# Patient Record
Sex: FEMALE | Race: ASIAN | NOT HISPANIC OR LATINO | Employment: UNEMPLOYED | URBAN - METROPOLITAN AREA
[De-identification: names, ages, dates, MRNs, and addresses within clinical notes are randomized per-mention and may not be internally consistent; named-entity substitution may affect disease eponyms.]

---

## 2020-02-12 ENCOUNTER — HOSPITAL ENCOUNTER (INPATIENT)
Facility: HOSPITAL | Age: 70
LOS: 3 days | Discharge: HOME | DRG: 342 | End: 2020-02-16
Attending: EMERGENCY MEDICINE | Admitting: FAMILY MEDICINE
Payer: COMMERCIAL

## 2020-02-12 ENCOUNTER — APPOINTMENT (EMERGENCY)
Dept: RADIOLOGY | Facility: HOSPITAL | Age: 70
DRG: 342 | End: 2020-02-12
Payer: COMMERCIAL

## 2020-02-12 DIAGNOSIS — K37 APPENDICITIS: Primary | ICD-10-CM

## 2020-02-12 DIAGNOSIS — E87.1 HYPONATREMIA: ICD-10-CM

## 2020-02-12 LAB
BASE EX.OXY STD BLDV CALC-SCNC: 76.7 % (ref 60–80)
BASE EXCESS BLDV CALC-SCNC: 1.5 MMOL/L
BASOPHILS # BLD AUTO: 0.05 THOUSANDS/ΜL (ref 0–0.1)
BASOPHILS NFR BLD AUTO: 1 % (ref 0–1)
BETA-HYDROXYBUTYRATE: 0.1 MMOL/L
EOSINOPHIL # BLD AUTO: 0.26 THOUSAND/ΜL (ref 0–0.61)
EOSINOPHIL NFR BLD AUTO: 3 % (ref 0–6)
ERYTHROCYTE [DISTWIDTH] IN BLOOD BY AUTOMATED COUNT: 12.9 % (ref 11.6–15.1)
GLUCOSE SERPL-MCNC: 172 MG/DL (ref 65–140)
HCO3 BLDV-SCNC: 26.5 MMOL/L (ref 24–30)
HCT VFR BLD AUTO: 36.9 % (ref 34.8–46.1)
HGB BLD-MCNC: 12.6 G/DL (ref 11.5–15.4)
IMM GRANULOCYTES # BLD AUTO: 0.03 THOUSAND/UL (ref 0–0.2)
IMM GRANULOCYTES NFR BLD AUTO: 0 % (ref 0–2)
LYMPHOCYTES # BLD AUTO: 1.37 THOUSANDS/ΜL (ref 0.6–4.47)
LYMPHOCYTES NFR BLD AUTO: 15 % (ref 14–44)
MCH RBC QN AUTO: 28.5 PG (ref 26.8–34.3)
MCHC RBC AUTO-ENTMCNC: 34.1 G/DL (ref 31.4–37.4)
MCV RBC AUTO: 84 FL (ref 82–98)
MONOCYTES # BLD AUTO: 0.46 THOUSAND/ΜL (ref 0.17–1.22)
MONOCYTES NFR BLD AUTO: 5 % (ref 4–12)
NEUTROPHILS # BLD AUTO: 7.22 THOUSANDS/ΜL (ref 1.85–7.62)
NEUTS SEG NFR BLD AUTO: 76 % (ref 43–75)
NRBC BLD AUTO-RTO: 0 /100 WBCS
O2 CT BLDV-SCNC: 14.7 ML/DL
PCO2 BLDV: 43.1 MM HG (ref 42–50)
PH BLDV: 7.41 [PH] (ref 7.3–7.4)
PLATELET # BLD AUTO: 292 THOUSANDS/UL (ref 149–390)
PMV BLD AUTO: 10.8 FL (ref 8.9–12.7)
PO2 BLDV: 40.8 MM HG (ref 35–45)
RBC # BLD AUTO: 4.42 MILLION/UL (ref 3.81–5.12)
WBC # BLD AUTO: 9.39 THOUSAND/UL (ref 4.31–10.16)

## 2020-02-12 PROCEDURE — 96361 HYDRATE IV INFUSION ADD-ON: CPT

## 2020-02-12 PROCEDURE — 85730 THROMBOPLASTIN TIME PARTIAL: CPT | Performed by: EMERGENCY MEDICINE

## 2020-02-12 PROCEDURE — 84443 ASSAY THYROID STIM HORMONE: CPT | Performed by: NURSE PRACTITIONER

## 2020-02-12 PROCEDURE — 71046 X-RAY EXAM CHEST 2 VIEWS: CPT

## 2020-02-12 PROCEDURE — 36415 COLL VENOUS BLD VENIPUNCTURE: CPT | Performed by: EMERGENCY MEDICINE

## 2020-02-12 PROCEDURE — 82550 ASSAY OF CK (CPK): CPT | Performed by: EMERGENCY MEDICINE

## 2020-02-12 PROCEDURE — 99285 EMERGENCY DEPT VISIT HI MDM: CPT

## 2020-02-12 PROCEDURE — 83690 ASSAY OF LIPASE: CPT | Performed by: EMERGENCY MEDICINE

## 2020-02-12 PROCEDURE — 82805 BLOOD GASES W/O2 SATURATION: CPT | Performed by: EMERGENCY MEDICINE

## 2020-02-12 PROCEDURE — 85610 PROTHROMBIN TIME: CPT | Performed by: EMERGENCY MEDICINE

## 2020-02-12 PROCEDURE — 87631 RESP VIRUS 3-5 TARGETS: CPT | Performed by: EMERGENCY MEDICINE

## 2020-02-12 PROCEDURE — 85025 COMPLETE CBC W/AUTO DIFF WBC: CPT | Performed by: EMERGENCY MEDICINE

## 2020-02-12 PROCEDURE — 82948 REAGENT STRIP/BLOOD GLUCOSE: CPT

## 2020-02-12 PROCEDURE — 82010 KETONE BODYS QUAN: CPT | Performed by: EMERGENCY MEDICINE

## 2020-02-12 PROCEDURE — 99285 EMERGENCY DEPT VISIT HI MDM: CPT | Performed by: EMERGENCY MEDICINE

## 2020-02-12 PROCEDURE — 83880 ASSAY OF NATRIURETIC PEPTIDE: CPT | Performed by: EMERGENCY MEDICINE

## 2020-02-12 PROCEDURE — 83605 ASSAY OF LACTIC ACID: CPT | Performed by: EMERGENCY MEDICINE

## 2020-02-12 PROCEDURE — 96375 TX/PRO/DX INJ NEW DRUG ADDON: CPT

## 2020-02-12 PROCEDURE — 87040 BLOOD CULTURE FOR BACTERIA: CPT | Performed by: EMERGENCY MEDICINE

## 2020-02-12 PROCEDURE — 93005 ELECTROCARDIOGRAM TRACING: CPT

## 2020-02-12 PROCEDURE — 84550 ASSAY OF BLOOD/URIC ACID: CPT | Performed by: NURSE PRACTITIONER

## 2020-02-12 PROCEDURE — 85379 FIBRIN DEGRADATION QUANT: CPT | Performed by: EMERGENCY MEDICINE

## 2020-02-12 PROCEDURE — 84484 ASSAY OF TROPONIN QUANT: CPT | Performed by: EMERGENCY MEDICINE

## 2020-02-12 PROCEDURE — 80053 COMPREHEN METABOLIC PANEL: CPT | Performed by: EMERGENCY MEDICINE

## 2020-02-12 RX ORDER — ONDANSETRON 2 MG/ML
4 INJECTION INTRAMUSCULAR; INTRAVENOUS ONCE
Status: COMPLETED | OUTPATIENT
Start: 2020-02-12 | End: 2020-02-12

## 2020-02-12 RX ORDER — SODIUM CHLORIDE 9 MG/ML
75 INJECTION, SOLUTION INTRAVENOUS CONTINUOUS
Status: DISCONTINUED | OUTPATIENT
Start: 2020-02-12 | End: 2020-02-13

## 2020-02-12 RX ORDER — HYDROMORPHONE HCL/PF 1 MG/ML
0.5 SYRINGE (ML) INJECTION ONCE
Status: COMPLETED | OUTPATIENT
Start: 2020-02-12 | End: 2020-02-12

## 2020-02-12 RX ADMIN — SODIUM CHLORIDE 500 ML: 0.9 INJECTION, SOLUTION INTRAVENOUS at 23:35

## 2020-02-12 RX ADMIN — ONDANSETRON 4 MG: 2 INJECTION INTRAMUSCULAR; INTRAVENOUS at 23:35

## 2020-02-12 RX ADMIN — HYDROMORPHONE HYDROCHLORIDE 0.5 MG: 1 INJECTION, SOLUTION INTRAMUSCULAR; INTRAVENOUS; SUBCUTANEOUS at 23:38

## 2020-02-13 ENCOUNTER — ANESTHESIA (INPATIENT)
Dept: PERIOP | Facility: HOSPITAL | Age: 70
DRG: 342 | End: 2020-02-13
Payer: COMMERCIAL

## 2020-02-13 ENCOUNTER — ANESTHESIA EVENT (INPATIENT)
Dept: PERIOP | Facility: HOSPITAL | Age: 70
DRG: 342 | End: 2020-02-13
Payer: COMMERCIAL

## 2020-02-13 ENCOUNTER — APPOINTMENT (EMERGENCY)
Dept: CT IMAGING | Facility: HOSPITAL | Age: 70
DRG: 342 | End: 2020-02-13
Payer: COMMERCIAL

## 2020-02-13 PROBLEM — E78.5 HYPERLIPIDEMIA: Status: ACTIVE | Noted: 2020-02-13

## 2020-02-13 PROBLEM — K37 APPENDICITIS: Status: ACTIVE | Noted: 2020-02-13

## 2020-02-13 PROBLEM — I10 HYPERTENSION: Status: ACTIVE | Noted: 2020-02-13

## 2020-02-13 PROBLEM — E87.1 HYPONATREMIA: Status: ACTIVE | Noted: 2020-02-13

## 2020-02-13 PROBLEM — E11.9 DIABETES MELLITUS, TYPE 2 (HCC): Status: ACTIVE | Noted: 2020-02-13

## 2020-02-13 LAB
ALBUMIN SERPL BCP-MCNC: 4 G/DL (ref 3.5–5)
ALP SERPL-CCNC: 82 U/L (ref 46–116)
ALT SERPL W P-5'-P-CCNC: 33 U/L (ref 12–78)
ANION GAP SERPL CALCULATED.3IONS-SCNC: 11 MMOL/L (ref 4–13)
ANION GAP SERPL CALCULATED.3IONS-SCNC: 8 MMOL/L (ref 4–13)
ANION GAP SERPL CALCULATED.3IONS-SCNC: 9 MMOL/L (ref 4–13)
APTT PPP: 34 SECONDS (ref 23–37)
AST SERPL W P-5'-P-CCNC: 17 U/L (ref 5–45)
ATRIAL RATE: 63 BPM
BACTERIA UR QL AUTO: ABNORMAL /HPF
BASOPHILS # BLD AUTO: 0.04 THOUSANDS/ΜL (ref 0–0.1)
BASOPHILS NFR BLD AUTO: 1 % (ref 0–1)
BILIRUB SERPL-MCNC: 0.6 MG/DL (ref 0.2–1)
BILIRUB UR QL STRIP: NEGATIVE
BUN SERPL-MCNC: 5 MG/DL (ref 5–25)
BUN SERPL-MCNC: 5 MG/DL (ref 5–25)
BUN SERPL-MCNC: 6 MG/DL (ref 5–25)
BUN SERPL-MCNC: 7 MG/DL (ref 5–25)
BUN SERPL-MCNC: 9 MG/DL (ref 5–25)
CALCIUM SERPL-MCNC: 8.1 MG/DL (ref 8.3–10.1)
CALCIUM SERPL-MCNC: 8.5 MG/DL (ref 8.3–10.1)
CALCIUM SERPL-MCNC: 8.7 MG/DL (ref 8.3–10.1)
CALCIUM SERPL-MCNC: 8.9 MG/DL (ref 8.3–10.1)
CALCIUM SERPL-MCNC: 9.1 MG/DL (ref 8.3–10.1)
CHLORIDE SERPL-SCNC: 101 MMOL/L (ref 100–108)
CHLORIDE SERPL-SCNC: 89 MMOL/L (ref 100–108)
CHLORIDE SERPL-SCNC: 96 MMOL/L (ref 100–108)
CHLORIDE SERPL-SCNC: 98 MMOL/L (ref 100–108)
CHLORIDE SERPL-SCNC: 98 MMOL/L (ref 100–108)
CK SERPL-CCNC: 92 U/L (ref 26–192)
CLARITY UR: CLEAR
CO2 SERPL-SCNC: 22 MMOL/L (ref 21–32)
CO2 SERPL-SCNC: 24 MMOL/L (ref 21–32)
CO2 SERPL-SCNC: 26 MMOL/L (ref 21–32)
CO2 SERPL-SCNC: 27 MMOL/L (ref 21–32)
CO2 SERPL-SCNC: 28 MMOL/L (ref 21–32)
COLOR UR: ABNORMAL
CORTIS AM PEAK SERPL-MCNC: 7.3 UG/DL (ref 4.2–22.4)
CREAT SERPL-MCNC: 0.51 MG/DL (ref 0.6–1.3)
CREAT SERPL-MCNC: 0.56 MG/DL (ref 0.6–1.3)
CREAT SERPL-MCNC: 0.58 MG/DL (ref 0.6–1.3)
CREAT SERPL-MCNC: 0.68 MG/DL (ref 0.6–1.3)
CREAT SERPL-MCNC: 0.89 MG/DL (ref 0.6–1.3)
D DIMER PPP FEU-MCNC: 0.54 UG/ML FEU
EOSINOPHIL # BLD AUTO: 0.14 THOUSAND/ΜL (ref 0–0.61)
EOSINOPHIL NFR BLD AUTO: 2 % (ref 0–6)
ERYTHROCYTE [DISTWIDTH] IN BLOOD BY AUTOMATED COUNT: 13 % (ref 11.6–15.1)
EST. AVERAGE GLUCOSE BLD GHB EST-MCNC: 134 MG/DL
FLUAV RNA NPH QL NAA+PROBE: NORMAL
FLUBV RNA NPH QL NAA+PROBE: NORMAL
GFR SERPL CREATININE-BSD FRML MDRD: 66 ML/MIN/1.73SQ M
GFR SERPL CREATININE-BSD FRML MDRD: 90 ML/MIN/1.73SQ M
GFR SERPL CREATININE-BSD FRML MDRD: 94 ML/MIN/1.73SQ M
GFR SERPL CREATININE-BSD FRML MDRD: 95 ML/MIN/1.73SQ M
GFR SERPL CREATININE-BSD FRML MDRD: 98 ML/MIN/1.73SQ M
GLUCOSE SERPL-MCNC: 104 MG/DL (ref 65–140)
GLUCOSE SERPL-MCNC: 110 MG/DL (ref 65–140)
GLUCOSE SERPL-MCNC: 113 MG/DL (ref 65–140)
GLUCOSE SERPL-MCNC: 124 MG/DL (ref 65–140)
GLUCOSE SERPL-MCNC: 127 MG/DL (ref 65–140)
GLUCOSE SERPL-MCNC: 134 MG/DL (ref 65–140)
GLUCOSE SERPL-MCNC: 140 MG/DL (ref 65–140)
GLUCOSE SERPL-MCNC: 147 MG/DL (ref 65–140)
GLUCOSE SERPL-MCNC: 165 MG/DL (ref 65–140)
GLUCOSE SERPL-MCNC: 222 MG/DL (ref 65–140)
GLUCOSE SERPL-MCNC: 234 MG/DL (ref 65–140)
GLUCOSE UR STRIP-MCNC: NEGATIVE MG/DL
HBA1C MFR BLD: 6.3 %
HCT VFR BLD AUTO: 33.5 % (ref 34.8–46.1)
HGB BLD-MCNC: 11.2 G/DL (ref 11.5–15.4)
HGB UR QL STRIP.AUTO: ABNORMAL
IMM GRANULOCYTES # BLD AUTO: 0.03 THOUSAND/UL (ref 0–0.2)
IMM GRANULOCYTES NFR BLD AUTO: 0 % (ref 0–2)
INR PPP: 1.08 (ref 0.84–1.19)
KETONES UR STRIP-MCNC: NEGATIVE MG/DL
LACTATE SERPL-SCNC: 0.9 MMOL/L (ref 0.5–2)
LEUKOCYTE ESTERASE UR QL STRIP: NEGATIVE
LIPASE SERPL-CCNC: 85 U/L (ref 73–393)
LYMPHOCYTES # BLD AUTO: 1.03 THOUSANDS/ΜL (ref 0.6–4.47)
LYMPHOCYTES NFR BLD AUTO: 14 % (ref 14–44)
MCH RBC QN AUTO: 28.7 PG (ref 26.8–34.3)
MCHC RBC AUTO-ENTMCNC: 33.4 G/DL (ref 31.4–37.4)
MCV RBC AUTO: 86 FL (ref 82–98)
MONOCYTES # BLD AUTO: 0.38 THOUSAND/ΜL (ref 0.17–1.22)
MONOCYTES NFR BLD AUTO: 5 % (ref 4–12)
NEUTROPHILS # BLD AUTO: 5.55 THOUSANDS/ΜL (ref 1.85–7.62)
NEUTS SEG NFR BLD AUTO: 78 % (ref 43–75)
NITRITE UR QL STRIP: NEGATIVE
NON-SQ EPI CELLS URNS QL MICRO: ABNORMAL /HPF
NRBC BLD AUTO-RTO: 0 /100 WBCS
NT-PROBNP SERPL-MCNC: 120 PG/ML
OSMOLALITY UR/SERPL-RTO: 266 MMOL/KG (ref 282–298)
OSMOLALITY UR/SERPL-RTO: 275 MMOL/KG (ref 282–298)
OSMOLALITY UR: 273 MMOL/KG
P AXIS: 63 DEGREES
PH UR STRIP.AUTO: 7.5 [PH]
PLATELET # BLD AUTO: 240 THOUSANDS/UL (ref 149–390)
PLATELET # BLD AUTO: 277 THOUSANDS/UL (ref 149–390)
PMV BLD AUTO: 10.3 FL (ref 8.9–12.7)
PMV BLD AUTO: 10.6 FL (ref 8.9–12.7)
POTASSIUM SERPL-SCNC: 3.3 MMOL/L (ref 3.5–5.3)
POTASSIUM SERPL-SCNC: 3.7 MMOL/L (ref 3.5–5.3)
POTASSIUM SERPL-SCNC: 3.8 MMOL/L (ref 3.5–5.3)
POTASSIUM SERPL-SCNC: 3.9 MMOL/L (ref 3.5–5.3)
POTASSIUM SERPL-SCNC: 3.9 MMOL/L (ref 3.5–5.3)
PR INTERVAL: 248 MS
PROT SERPL-MCNC: 7.9 G/DL (ref 6.4–8.2)
PROT UR STRIP-MCNC: NEGATIVE MG/DL
PROTHROMBIN TIME: 13.4 SECONDS (ref 11.6–14.5)
QRS AXIS: 50 DEGREES
QRSD INTERVAL: 90 MS
QT INTERVAL: 404 MS
QTC INTERVAL: 407 MS
RBC # BLD AUTO: 3.9 MILLION/UL (ref 3.81–5.12)
RBC #/AREA URNS AUTO: ABNORMAL /HPF
RSV RNA NPH QL NAA+PROBE: NORMAL
SODIUM 24H UR-SCNC: 80 MOL/L
SODIUM SERPL-SCNC: 126 MMOL/L (ref 136–145)
SODIUM SERPL-SCNC: 129 MMOL/L (ref 136–145)
SODIUM SERPL-SCNC: 131 MMOL/L (ref 136–145)
SODIUM SERPL-SCNC: 133 MMOL/L (ref 136–145)
SODIUM SERPL-SCNC: 136 MMOL/L (ref 136–145)
SP GR UR STRIP.AUTO: 1.01 (ref 1–1.03)
T WAVE AXIS: 67 DEGREES
TROPONIN I SERPL-MCNC: <0.02 NG/ML
TSH SERPL DL<=0.05 MIU/L-ACNC: 0.85 UIU/ML (ref 0.36–3.74)
URATE SERPL-MCNC: 3.6 MG/DL (ref 2–6.8)
UROBILINOGEN UR QL STRIP.AUTO: 0.2 E.U./DL
VENTRICULAR RATE: 61 BPM
WBC # BLD AUTO: 7.17 THOUSAND/UL (ref 4.31–10.16)
WBC #/AREA URNS AUTO: ABNORMAL /HPF

## 2020-02-13 PROCEDURE — 80048 BASIC METABOLIC PNL TOTAL CA: CPT | Performed by: INTERNAL MEDICINE

## 2020-02-13 PROCEDURE — 81001 URINALYSIS AUTO W/SCOPE: CPT | Performed by: EMERGENCY MEDICINE

## 2020-02-13 PROCEDURE — 71275 CT ANGIOGRAPHY CHEST: CPT

## 2020-02-13 PROCEDURE — 93010 ELECTROCARDIOGRAM REPORT: CPT | Performed by: INTERNAL MEDICINE

## 2020-02-13 PROCEDURE — 83935 ASSAY OF URINE OSMOLALITY: CPT | Performed by: INTERNAL MEDICINE

## 2020-02-13 PROCEDURE — 83930 ASSAY OF BLOOD OSMOLALITY: CPT | Performed by: INTERNAL MEDICINE

## 2020-02-13 PROCEDURE — 84300 ASSAY OF URINE SODIUM: CPT | Performed by: NURSE PRACTITIONER

## 2020-02-13 PROCEDURE — 74177 CT ABD & PELVIS W/CONTRAST: CPT

## 2020-02-13 PROCEDURE — 80048 BASIC METABOLIC PNL TOTAL CA: CPT | Performed by: SURGERY

## 2020-02-13 PROCEDURE — 96365 THER/PROPH/DIAG IV INF INIT: CPT

## 2020-02-13 PROCEDURE — 88304 TISSUE EXAM BY PATHOLOGIST: CPT | Performed by: PATHOLOGY

## 2020-02-13 PROCEDURE — 99221 1ST HOSP IP/OBS SF/LOW 40: CPT | Performed by: INTERNAL MEDICINE

## 2020-02-13 PROCEDURE — 85049 AUTOMATED PLATELET COUNT: CPT | Performed by: PHYSICIAN ASSISTANT

## 2020-02-13 PROCEDURE — 83036 HEMOGLOBIN GLYCOSYLATED A1C: CPT | Performed by: NURSE PRACTITIONER

## 2020-02-13 PROCEDURE — 85025 COMPLETE CBC W/AUTO DIFF WBC: CPT | Performed by: NURSE PRACTITIONER

## 2020-02-13 PROCEDURE — 99223 1ST HOSP IP/OBS HIGH 75: CPT | Performed by: INTERNAL MEDICINE

## 2020-02-13 PROCEDURE — 0DTJ4ZZ RESECTION OF APPENDIX, PERCUTANEOUS ENDOSCOPIC APPROACH: ICD-10-PCS | Performed by: SURGERY

## 2020-02-13 PROCEDURE — 83930 ASSAY OF BLOOD OSMOLALITY: CPT | Performed by: NURSE PRACTITIONER

## 2020-02-13 PROCEDURE — NC001 PR NO CHARGE: Performed by: PHYSICIAN ASSISTANT

## 2020-02-13 PROCEDURE — 96361 HYDRATE IV INFUSION ADD-ON: CPT

## 2020-02-13 PROCEDURE — 80048 BASIC METABOLIC PNL TOTAL CA: CPT | Performed by: NURSE PRACTITIONER

## 2020-02-13 PROCEDURE — 70450 CT HEAD/BRAIN W/O DYE: CPT

## 2020-02-13 PROCEDURE — 83935 ASSAY OF URINE OSMOLALITY: CPT | Performed by: NURSE PRACTITIONER

## 2020-02-13 PROCEDURE — 82533 TOTAL CORTISOL: CPT | Performed by: NURSE PRACTITIONER

## 2020-02-13 PROCEDURE — 82948 REAGENT STRIP/BLOOD GLUCOSE: CPT

## 2020-02-13 RX ORDER — OXYCODONE HYDROCHLORIDE 5 MG/1
2.5 TABLET ORAL EVERY 4 HOURS PRN
Status: DISCONTINUED | OUTPATIENT
Start: 2020-02-13 | End: 2020-02-16 | Stop reason: HOSPADM

## 2020-02-13 RX ORDER — LOSARTAN POTASSIUM 50 MG/1
50 TABLET ORAL DAILY
Status: DISCONTINUED | OUTPATIENT
Start: 2020-02-14 | End: 2020-02-16 | Stop reason: HOSPADM

## 2020-02-13 RX ORDER — FENTANYL CITRATE 50 UG/ML
INJECTION, SOLUTION INTRAMUSCULAR; INTRAVENOUS AS NEEDED
Status: DISCONTINUED | OUTPATIENT
Start: 2020-02-13 | End: 2020-02-13 | Stop reason: SURG

## 2020-02-13 RX ORDER — CEFAZOLIN SODIUM 2 G/50ML
2000 SOLUTION INTRAVENOUS EVERY 8 HOURS
Status: DISCONTINUED | OUTPATIENT
Start: 2020-02-13 | End: 2020-02-16

## 2020-02-13 RX ORDER — MAGNESIUM HYDROXIDE 1200 MG/15ML
LIQUID ORAL AS NEEDED
Status: DISCONTINUED | OUTPATIENT
Start: 2020-02-13 | End: 2020-02-13 | Stop reason: HOSPADM

## 2020-02-13 RX ORDER — SODIUM CHLORIDE 9 MG/ML
INJECTION, SOLUTION INTRAVENOUS CONTINUOUS PRN
Status: DISCONTINUED | OUTPATIENT
Start: 2020-02-13 | End: 2020-02-13 | Stop reason: SURG

## 2020-02-13 RX ORDER — SODIUM CHLORIDE 9 MG/ML
INJECTION, SOLUTION INTRAVENOUS AS NEEDED
Status: DISCONTINUED | OUTPATIENT
Start: 2020-02-13 | End: 2020-02-13 | Stop reason: HOSPADM

## 2020-02-13 RX ORDER — PROPOFOL 10 MG/ML
INJECTION, EMULSION INTRAVENOUS AS NEEDED
Status: DISCONTINUED | OUTPATIENT
Start: 2020-02-13 | End: 2020-02-13 | Stop reason: SURG

## 2020-02-13 RX ORDER — DEXAMETHASONE SODIUM PHOSPHATE 4 MG/ML
INJECTION, SOLUTION INTRA-ARTICULAR; INTRALESIONAL; INTRAMUSCULAR; INTRAVENOUS; SOFT TISSUE AS NEEDED
Status: DISCONTINUED | OUTPATIENT
Start: 2020-02-13 | End: 2020-02-13 | Stop reason: SURG

## 2020-02-13 RX ORDER — FENTANYL CITRATE/PF 50 MCG/ML
25 SYRINGE (ML) INJECTION
Status: DISCONTINUED | OUTPATIENT
Start: 2020-02-13 | End: 2020-02-13 | Stop reason: HOSPADM

## 2020-02-13 RX ORDER — HYDROMORPHONE HCL/PF 1 MG/ML
0.2 SYRINGE (ML) INJECTION
Status: DISCONTINUED | OUTPATIENT
Start: 2020-02-13 | End: 2020-02-13 | Stop reason: HOSPADM

## 2020-02-13 RX ORDER — METOPROLOL SUCCINATE 25 MG/1
25 TABLET, EXTENDED RELEASE ORAL DAILY
Status: DISCONTINUED | OUTPATIENT
Start: 2020-02-14 | End: 2020-02-16 | Stop reason: HOSPADM

## 2020-02-13 RX ORDER — POTASSIUM CHLORIDE 20 MEQ/1
20 TABLET, EXTENDED RELEASE ORAL ONCE
Status: COMPLETED | OUTPATIENT
Start: 2020-02-13 | End: 2020-02-13

## 2020-02-13 RX ORDER — EPHEDRINE SULFATE 50 MG/ML
INJECTION INTRAVENOUS AS NEEDED
Status: DISCONTINUED | OUTPATIENT
Start: 2020-02-13 | End: 2020-02-13 | Stop reason: SURG

## 2020-02-13 RX ORDER — ONDANSETRON 2 MG/ML
4 INJECTION INTRAMUSCULAR; INTRAVENOUS ONCE AS NEEDED
Status: DISCONTINUED | OUTPATIENT
Start: 2020-02-13 | End: 2020-02-13 | Stop reason: HOSPADM

## 2020-02-13 RX ORDER — SUCCINYLCHOLINE/SOD CL,ISO/PF 100 MG/5ML
SYRINGE (ML) INTRAVENOUS AS NEEDED
Status: DISCONTINUED | OUTPATIENT
Start: 2020-02-13 | End: 2020-02-13 | Stop reason: SURG

## 2020-02-13 RX ORDER — HEPARIN SODIUM 5000 [USP'U]/ML
5000 INJECTION, SOLUTION INTRAVENOUS; SUBCUTANEOUS EVERY 8 HOURS SCHEDULED
Status: DISCONTINUED | OUTPATIENT
Start: 2020-02-13 | End: 2020-02-16 | Stop reason: HOSPADM

## 2020-02-13 RX ORDER — GLIMEPIRIDE 1 MG/1
1 TABLET ORAL
COMMUNITY
End: 2020-05-28 | Stop reason: SDUPTHER

## 2020-02-13 RX ORDER — DEXTROSE MONOHYDRATE 50 MG/ML
50 INJECTION, SOLUTION INTRAVENOUS CONTINUOUS
Status: DISCONTINUED | OUTPATIENT
Start: 2020-02-13 | End: 2020-02-14

## 2020-02-13 RX ORDER — INDAPAMIDE 2.5 MG/1
1.25 TABLET, FILM COATED ORAL DAILY
Status: DISCONTINUED | OUTPATIENT
Start: 2020-02-13 | End: 2020-02-13

## 2020-02-13 RX ORDER — SODIUM CHLORIDE, SODIUM LACTATE, POTASSIUM CHLORIDE, CALCIUM CHLORIDE 600; 310; 30; 20 MG/100ML; MG/100ML; MG/100ML; MG/100ML
INJECTION, SOLUTION INTRAVENOUS CONTINUOUS PRN
Status: DISCONTINUED | OUTPATIENT
Start: 2020-02-13 | End: 2020-02-13 | Stop reason: SURG

## 2020-02-13 RX ORDER — LOSARTAN POTASSIUM 50 MG/1
50 TABLET ORAL DAILY
Status: DISCONTINUED | OUTPATIENT
Start: 2020-02-13 | End: 2020-02-13

## 2020-02-13 RX ORDER — TELMISARTAN 40 MG/1
40 TABLET ORAL DAILY
COMMUNITY
End: 2020-05-19 | Stop reason: SDUPTHER

## 2020-02-13 RX ORDER — GLYCOPYRROLATE 0.2 MG/ML
INJECTION INTRAMUSCULAR; INTRAVENOUS AS NEEDED
Status: DISCONTINUED | OUTPATIENT
Start: 2020-02-13 | End: 2020-02-13 | Stop reason: SURG

## 2020-02-13 RX ORDER — ROCURONIUM BROMIDE 10 MG/ML
INJECTION, SOLUTION INTRAVENOUS AS NEEDED
Status: DISCONTINUED | OUTPATIENT
Start: 2020-02-13 | End: 2020-02-13 | Stop reason: SURG

## 2020-02-13 RX ORDER — ACETAMINOPHEN 325 MG/1
650 TABLET ORAL EVERY 6 HOURS PRN
Status: DISCONTINUED | OUTPATIENT
Start: 2020-02-13 | End: 2020-02-16 | Stop reason: HOSPADM

## 2020-02-13 RX ORDER — CEFAZOLIN SODIUM 1 G/50ML
1000 SOLUTION INTRAVENOUS ONCE
Status: COMPLETED | OUTPATIENT
Start: 2020-02-13 | End: 2020-02-13

## 2020-02-13 RX ORDER — ROSUVASTATIN CALCIUM 10 MG/1
10 TABLET, COATED ORAL DAILY
COMMUNITY
End: 2020-10-13 | Stop reason: SDUPTHER

## 2020-02-13 RX ORDER — METOPROLOL SUCCINATE 25 MG/1
25 TABLET, EXTENDED RELEASE ORAL DAILY
Status: DISCONTINUED | OUTPATIENT
Start: 2020-02-13 | End: 2020-02-13

## 2020-02-13 RX ORDER — LIDOCAINE HYDROCHLORIDE 10 MG/ML
INJECTION, SOLUTION EPIDURAL; INFILTRATION; INTRACAUDAL; PERINEURAL AS NEEDED
Status: DISCONTINUED | OUTPATIENT
Start: 2020-02-13 | End: 2020-02-13 | Stop reason: SURG

## 2020-02-13 RX ORDER — NEOSTIGMINE METHYLSULFATE 1 MG/ML
INJECTION INTRAVENOUS AS NEEDED
Status: DISCONTINUED | OUTPATIENT
Start: 2020-02-13 | End: 2020-02-13 | Stop reason: SURG

## 2020-02-13 RX ORDER — ONDANSETRON 2 MG/ML
INJECTION INTRAMUSCULAR; INTRAVENOUS AS NEEDED
Status: DISCONTINUED | OUTPATIENT
Start: 2020-02-13 | End: 2020-02-13 | Stop reason: SURG

## 2020-02-13 RX ORDER — OXYCODONE HYDROCHLORIDE 5 MG/1
5 TABLET ORAL EVERY 4 HOURS PRN
Status: DISCONTINUED | OUTPATIENT
Start: 2020-02-13 | End: 2020-02-16 | Stop reason: HOSPADM

## 2020-02-13 RX ORDER — PRAVASTATIN SODIUM 80 MG/1
80 TABLET ORAL
Status: DISCONTINUED | OUTPATIENT
Start: 2020-02-13 | End: 2020-02-16 | Stop reason: HOSPADM

## 2020-02-13 RX ORDER — BUPIVACAINE HYDROCHLORIDE 2.5 MG/ML
INJECTION, SOLUTION EPIDURAL; INFILTRATION; INTRACAUDAL AS NEEDED
Status: DISCONTINUED | OUTPATIENT
Start: 2020-02-13 | End: 2020-02-13 | Stop reason: HOSPADM

## 2020-02-13 RX ORDER — PANTOPRAZOLE SODIUM 40 MG/1
40 TABLET, DELAYED RELEASE ORAL
Status: DISCONTINUED | OUTPATIENT
Start: 2020-02-13 | End: 2020-02-13

## 2020-02-13 RX ORDER — HYDROMORPHONE HCL/PF 1 MG/ML
0.2 SYRINGE (ML) INJECTION EVERY 4 HOURS PRN
Status: DISCONTINUED | OUTPATIENT
Start: 2020-02-13 | End: 2020-02-16 | Stop reason: HOSPADM

## 2020-02-13 RX ORDER — PANTOPRAZOLE SODIUM 40 MG/1
40 TABLET, DELAYED RELEASE ORAL DAILY
COMMUNITY
End: 2020-02-16 | Stop reason: HOSPADM

## 2020-02-13 RX ORDER — KETOROLAC TROMETHAMINE 30 MG/ML
INJECTION, SOLUTION INTRAMUSCULAR; INTRAVENOUS AS NEEDED
Status: DISCONTINUED | OUTPATIENT
Start: 2020-02-13 | End: 2020-02-13 | Stop reason: SURG

## 2020-02-13 RX ADMIN — CEFAZOLIN SODIUM 1000 MG: 1 SOLUTION INTRAVENOUS at 14:01

## 2020-02-13 RX ADMIN — SODIUM CHLORIDE: 0.9 INJECTION, SOLUTION INTRAVENOUS at 14:48

## 2020-02-13 RX ADMIN — CEFAZOLIN SODIUM 2000 MG: 2 SOLUTION INTRAVENOUS at 10:34

## 2020-02-13 RX ADMIN — OXYCODONE HYDROCHLORIDE 2.5 MG: 5 TABLET ORAL at 04:40

## 2020-02-13 RX ADMIN — SODIUM CHLORIDE, SODIUM LACTATE, POTASSIUM CHLORIDE, AND CALCIUM CHLORIDE: .6; .31; .03; .02 INJECTION, SOLUTION INTRAVENOUS at 13:54

## 2020-02-13 RX ADMIN — POTASSIUM CHLORIDE 20 MEQ: 1500 TABLET, EXTENDED RELEASE ORAL at 00:40

## 2020-02-13 RX ADMIN — GLYCOPYRROLATE 0.4 MG: 0.2 INJECTION, SOLUTION INTRAMUSCULAR; INTRAVENOUS at 14:54

## 2020-02-13 RX ADMIN — INDAPAMIDE 1.25 MG: 2.5 TABLET, FILM COATED ORAL at 09:07

## 2020-02-13 RX ADMIN — HEPARIN SODIUM 5000 UNITS: 5000 INJECTION INTRAVENOUS; SUBCUTANEOUS at 21:29

## 2020-02-13 RX ADMIN — HEPARIN SODIUM 5000 UNITS: 5000 INJECTION INTRAVENOUS; SUBCUTANEOUS at 16:30

## 2020-02-13 RX ADMIN — CEFAZOLIN SODIUM 2000 MG: 2 SOLUTION INTRAVENOUS at 17:37

## 2020-02-13 RX ADMIN — DEXTROSE 100 ML/HR: 5 SOLUTION INTRAVENOUS at 16:32

## 2020-02-13 RX ADMIN — PROPOFOL 150 MG: 10 INJECTION, EMULSION INTRAVENOUS at 14:05

## 2020-02-13 RX ADMIN — NEOSTIGMINE METHYLSULFATE 3 MG: 1 INJECTION INTRAVENOUS at 14:54

## 2020-02-13 RX ADMIN — CEFAZOLIN SODIUM 1000 MG: 1 SOLUTION INTRAVENOUS at 02:00

## 2020-02-13 RX ADMIN — FENTANYL CITRATE 50 MCG: 50 INJECTION INTRAMUSCULAR; INTRAVENOUS at 14:05

## 2020-02-13 RX ADMIN — IOHEXOL 100 ML: 350 INJECTION, SOLUTION INTRAVENOUS at 01:11

## 2020-02-13 RX ADMIN — POTASSIUM CHLORIDE 20 MEQ: 1500 TABLET, EXTENDED RELEASE ORAL at 10:34

## 2020-02-13 RX ADMIN — Medication 60 MG: at 14:05

## 2020-02-13 RX ADMIN — ROCURONIUM BROMIDE 15 MG: 10 SOLUTION INTRAVENOUS at 14:12

## 2020-02-13 RX ADMIN — FENTANYL CITRATE 50 MCG: 50 INJECTION INTRAMUSCULAR; INTRAVENOUS at 14:27

## 2020-02-13 RX ADMIN — PANTOPRAZOLE SODIUM 40 MG: 40 TABLET, DELAYED RELEASE ORAL at 05:04

## 2020-02-13 RX ADMIN — PRAVASTATIN SODIUM 80 MG: 80 TABLET ORAL at 16:30

## 2020-02-13 RX ADMIN — LIDOCAINE HYDROCHLORIDE 50 MG: 10 INJECTION, SOLUTION EPIDURAL; INFILTRATION; INTRACAUDAL; PERINEURAL at 14:05

## 2020-02-13 RX ADMIN — ROCURONIUM BROMIDE 10 MG: 10 SOLUTION INTRAVENOUS at 14:05

## 2020-02-13 RX ADMIN — LOSARTAN POTASSIUM 50 MG: 50 TABLET, FILM COATED ORAL at 09:08

## 2020-02-13 RX ADMIN — SODIUM CHLORIDE 125 ML/HR: 0.9 INJECTION, SOLUTION INTRAVENOUS at 03:20

## 2020-02-13 RX ADMIN — ONDANSETRON 4 MG: 2 INJECTION INTRAMUSCULAR; INTRAVENOUS at 14:11

## 2020-02-13 RX ADMIN — METRONIDAZOLE 500 MG: 500 INJECTION, SOLUTION INTRAVENOUS at 11:48

## 2020-02-13 RX ADMIN — KETOROLAC TROMETHAMINE 15 MG: 30 INJECTION, SOLUTION INTRAMUSCULAR at 14:51

## 2020-02-13 RX ADMIN — SODIUM CHLORIDE 125 ML/HR: 0.9 INJECTION, SOLUTION INTRAVENOUS at 00:31

## 2020-02-13 RX ADMIN — DEXAMETHASONE SODIUM PHOSPHATE 4 MG: 4 INJECTION, SOLUTION INTRAMUSCULAR; INTRAVENOUS at 14:11

## 2020-02-13 RX ADMIN — OXYCODONE HYDROCHLORIDE 2.5 MG: 5 TABLET ORAL at 21:33

## 2020-02-13 RX ADMIN — ACETAMINOPHEN 650 MG: 325 TABLET, FILM COATED ORAL at 17:54

## 2020-02-13 RX ADMIN — METRONIDAZOLE 500 MG: 500 INJECTION, SOLUTION INTRAVENOUS at 02:36

## 2020-02-13 RX ADMIN — METRONIDAZOLE 500 MG: 500 INJECTION, SOLUTION INTRAVENOUS at 18:47

## 2020-02-13 RX ADMIN — EPHEDRINE SULFATE 15 MG: 50 INJECTION, SOLUTION INTRAVENOUS at 14:16

## 2020-02-13 RX ADMIN — INSULIN LISPRO 1 UNITS: 100 INJECTION, SOLUTION INTRAVENOUS; SUBCUTANEOUS at 21:29

## 2020-02-13 NOTE — ASSESSMENT & PLAN NOTE
 Sodium level upon admission: 126   Secondary to likely dehydration   Treatment: IV hydration   Additional Studies:  TSH, osmolality, urine osmolality, sodium urine, uric acid and cortisol level  [facility] Neph[facility] consult   Monitor BMP q 6 hours

## 2020-02-13 NOTE — ASSESSMENT & PLAN NOTE
· Patient presented with right lower quadrant pain  · PE study with CT of the abdomen:  No evidence of pulmonary embolism  No focal lung consolidation  The appendix mildly prominent measuring 7 mm in diameter  There is a mild surrounding fat stranding  "Findings are suspicious for early appendicitis "  · Surgery consult  · Continue IV Ancef and IV Flagyl  · IV fluids  · NPO

## 2020-02-13 NOTE — CONSULTS
Consultation -General Surgery  [facility] Lnu 71 y o  female MRN: [id number]  Unit/Bed#: S -01 Encounter: [id number]          ASSESSMENT:  Problem List     Hyponatremia    Appendicitis    Hypertension    Diabetes mellitus, type 2 ([facility] )    No results found for: HGBA1C    Recent Labs     02/12/20  2331 02/13/20  0501 02/13/20  0657   POCGLU 172* 127 113       Blood Sugar Average: Last 72 hrs:  (P) 390 [id number]        Hyperlipidemia        66-year-old female PMH hypertension, hyperlipidemia, diabetes who presented to ED for hyponatremia and right-sided abdominal pain x1 day, found to have likely early appendicitis on CT scan  Plan:  1  Abd pain  - likely secondary to appendicitis  - keep npo for now  - will discuss with attending  - pain is improving  - no leukocytosis, afebrile  VSS  - IVF  - IV abx    2  Hyponatremia  - managed per primary team  - Improved from 126 to 129 today    Rest of care per primary medicine team      Reason for Consult / Principal Problem:    HPI: Nalini Henry is a 71y o  year old female who presents to ED for hyponatremia and abd pain x1 day  Pt originally went to an urgent care which showed a sodium of 125 and was then sent the ED  CT scan in ED showed concern for early appendicitis  Patient states she had 1 day of right lower quadrant abdominal pain that moved up to right flank with nausea, no vomiting  Admits to decreased appetite, chills  States abdominal pain is worsened with deep breath  Denies fevers, chills, chest pain, shortness of breath, changes to bowel or bladder  Last BM was yesterday morning and was normal   Per patient, abdominal pain is improving  Only surgical history is had hysterectomy times 20 years ago  Denies taking any anticoagulation  Per son-in-law, patient takes aspirin but patient denies this    Son-in-law states he does not think patient will elect to do surgery here and instead go to [facility] for surgery due to more people to take care of her in [place]  However while using blue phone, patient states she is amenable for surgery here in the hospital       [facility] phone personnel pin #: [phone]  Pt speaks Victorino  Review of Systems   Constitutional: Positive for activity change, appetite change, chills and fatigue  Negative for diaphoresis and fever  Respiratory: Negative for shortness of breath  Gastrointestinal: Positive for abdominal pain and nausea  Negative for blood in stool, constipation, diarrhea and vomiting  Genitourinary: Negative for difficulty urinating and hematuria  All other systems reviewed and are negative  Historical Information   Past Medical History:   Diagnosis Date    Diabetes mellitus ([address] )     Hyperlipidemia     Hypertension      Past Surgical History:   Procedure Laterality Date    HYSTERECTOMY       Social History   Social History     Substance and Sexual Activity   Alcohol Use Never    Frequency: Never     Social History     Substance and Sexual Activity   Drug Use Never     Social History     Tobacco Use   Smoking Status Never Smoker   Smokeless Tobacco Never Used     History reviewed  No pertinent family history      Meds/Allergies     Medications Prior to Admission   Medication    glimepiride (AMARYL) 1 mg tablet    INDAPAMIDE PO    METOPROLOL SUCCINATE PO    NON FORMULARY    pantoprazole (PROTONIX) 40 mg tablet    rosuvastatin (CRESTOR) 10 MG tablet    telmisartan (MICARDIS) 40 mg tablet     Current Facility-Administered Medications   Medication Dose Route Frequency    acetaminophen (TYLENOL) tablet 650 mg  650 mg Oral Q6H PRN    ceFAZolin (ANCEF) IVPB (premix) 2,000 mg  2,000 mg Intravenous Q8H    HYDROmorphone (DILAUDID) injection 0 2 mg  0 2 mg Intravenous Q4H PRN    indapamide (LOZOL) tablet 1 25 mg  1 25 mg Oral Daily    influenza vaccine, high-dose (FLUZONE HIGH-DOSE) IM injection NUBIA 0 5 mL  0 5 mL Intramuscular Once    insulin lispro (HumaLOG) 100 units/mL subcutaneous injection 1-5 Units  1-5 Units Subcutaneous Q6H [facility]    losartan (COZAAR) tablet 50 mg  50 mg Oral Daily    metoprolol succinate (TOPROL-XL) 24 hr tablet 25 mg  25 mg Oral Daily    metroNIDAZOLE (FLAGYL) IVPB (premix) 500 mg  500 mg Intravenous Q8H    oxyCODONE (ROXICODONE) IR tablet 2 5 mg  2 5 mg Oral Q4H PRN    oxyCODONE (ROXICODONE) IR tablet 5 mg  5 mg Oral Q4H PRN    pantoprazole (PROTONIX) EC tablet 40 mg  40 mg Oral Early Morning    pravastatin (PRAVACHOL) tablet 80 mg  80 mg Oral Daily With Dinner    sodium chloride 0 9 % infusion  100 mL/hr Intravenous Continuous       No Known Allergies    Objective     Blood pressure 110/60, pulse 60, temperature 98 6 °F (37 °C), temperature source Oral, resp  rate 18, height 5' (1 524 m), weight 62 2 kg (137 lb 2 oz), SpO2 97 %  Intake/Output Summary (Last 24 hours) at 2/13/2020 0838  Last data filed at 2/13/2020 0538  Gross per 24 hour   Intake 550 ml   Output 900 ml   Net -350 ml       PHYSICAL EXAM  Physical Exam   Constitutional: She is oriented to person, place, and time  She appears well-developed and well-nourished  No distress  Cardiovascular: Normal rate, regular rhythm and normal heart sounds  Exam reveals no gallop and no friction rub  No murmur heard  Abdominal: Soft  Bowel sounds are normal  She exhibits no distension and no mass  There is tenderness (in RLQ > RUQ)  There is tenderness at McBurney's point  There is no rebound, no guarding and no CVA tenderness  Neurological: She is alert and oriented to person, place, and time  Skin: Skin is warm and dry  She is not diaphoretic  Psychiatric: She has a normal mood and affect  Her behavior is normal    Nursing note and vitals reviewed  Ext: warm, no obvious deformity or edema      Lab Results:   I have personally reviewed pertinent lab results    , CBC:   Lab Results   Component Value Date    WBC 7 17 02/13/2020    HGB 11 2 (L) 02/13/2020    HCT 33 5 (L) 02/13/2020    MCV 86 02/13/2020     02/13/2020    MCH 28 7 02/13/2020    MCHC 33 4 02/13/2020    RDW 13 0 02/13/2020    MPV 10 3 02/13/2020    NRBC 0 02/13/2020   , CMP:   Lab Results   Component Value Date    SODIUM 129 (L) 02/13/2020    K 3 7 02/13/2020    CL 96 (L) 02/13/2020    CO2 24 02/13/2020    BUN 6 02/13/2020    CREATININE 0 51 (L) 02/13/2020    CALCIUM 8 1 (L) 02/13/2020    AST 17 02/12/2020    ALT 33 02/12/2020    ALKPHOS 82 02/12/2020    EGFR 98 02/13/2020   , Coagulation:   Lab Results   Component Value Date    INR 1 08 02/12/2020   , Urinalysis:   Lab Results   Component Value Date    COLORU Light Yellow 02/13/2020    CLARITYU Clear 02/13/2020    SPECGRAV 1 010 02/13/2020    PHUR 7 5 02/13/2020    LEUKOCYTESUR Negative 02/13/2020    NITRITE Negative 02/13/2020    GLUCOSEU Negative 02/13/2020    KETONESU Negative 02/13/2020    BILIRUBINUR Negative 02/13/2020    BLOODU Trace-Intact (A) 02/13/2020   , Amylase: No results found for: AMYLASE, Lipase:   Lab Results   Component Value Date    LIPASE 85 02/12/2020     Imaging: I have personally reviewed pertinent reports  CT chest/abd/pelvis 2/13:   1  No evidence of pulmonary embolism  No focal lung consolidation  2   The appendix is mildly prominent measuring 7 mm in diameter  There is mild surrounding fat stranding  Findings are suspicious for early appendicitis  Counseling / Coordination of Care  Total time spent today  30 minutes  Greater than 50% of total time was spent with the patient and / or family counseling and / or coordination of care           Mary Alice Lucia PA-C  2/13/2020 8:38 AM

## 2020-02-13 NOTE — H&P
[facility]    H&P- Samarvati Lnu 1950, 71 y o  female MRN: [id number]    Unit/Bed#: S -[place] Encounter: [id number]    Primary Care Provider: No primary care provider on file  Date and time admitted to hospital: 2/12/2020 10:54 PM        Hyponatremia  Assessment & Plan   Sodium level upon admission: 126   Secondary to likely dehydration   Treatment: IV hydration   Additional Studies:  TSH, osmolality, urine osmolality, sodium urine, uric acid and cortisol level  [facility]   Monitor BMP q 6 hours  Appendicitis  Assessment & Plan  · Patient presented with right lower quadrant pain  · PE study with CT of the abdomen:  No evidence of pulmonary embolism  No focal lung consolidation  The appendix mildly prominent measuring 7 mm in diameter  There is a mild surrounding fat stranding  "Findings are suspicious for early appendicitis "  · Surgery consult  · Continue IV Ancef and IV Flagyl  · IV fluids  · NPO  Hypertension  Assessment & Plan  · Continue home medications  Diabetes mellitus, type 2 [facility])  Assessment & Plan  No results found for: HGBA1C    Recent Labs     02/12/20  2331 02/13/20  0501   POCGLU 172* 127       Blood Sugar Average: Last 72 hrs:  (P) 149 5   · Home regimen of Amaryl  · Sliding scale insulin and q 6 hour Accu-Cheks  Hyperlipidemia  Assessment & Plan  · Continue statin  VTE Prophylaxis: Pharmacologic VTE Prophylaxis contraindicated due to possible surgical intervention  / sequential compression device   Code Status: Full  POLST: POLST form is not discussed and not completed at this time  Discussion with family: Son-in-law at bedside  Anticipated Length of Stay:  Patient will be admitted on an Inpatient basis with an anticipated length of stay of  > 2 midnights  Justification for Hospital Stay: Surgery consult, Hyponatremia    Total Time for Visit, including Counseling / Coordination of Care: 1 hour    Greater than 50% of this total time spent on direct patient counseling and coordination of care  Chief Complaint:   Abdominal pain, loss of appetite, and nausea  History of Present Illness:    Maira Yu is a 71 y o  female with a past medical history of HTN, HLD and DM2 who presents with right-sided abdominal pain, loss of appetite nausea  Patient's history of present illness is limited due to the fact that the patient only speaks Victorino only  Patient has been visiting from [place] for the past 3 months  Patient's son in law was at the bedside providing history  The patient initially presented to urgent care yesterday with complaints of worsening right-sided abdominal pain and nausea  When at the urgent care, patient's sodium level was found to be 125; therefore, she was refered to the ER for further evaluation  Her son-in-law reports that she was reluctant to come to the hospital   However with convincing she agreed to being evaluated in ER  Upon evaluation in the ER, patient's sodium was 126 on admission  CT of the abdomen indicated an early appendicitis  ER provider discussed case with surgery on-call  Patient was admitted for serial lab monitoring due to hyponatremia and surgery consult evaluation for appendicitis  Son-in-law is concerned that patient will not be agreeable if a surgical intervention is necessary  He also discussed that family in [place] will need to provide consent as well due to cultural beliefs  Patient reports loss of appetite, nausea, right-sided abdominal pain  She denies chest pain, diarrhea, vomiting, shortness of breath or cough  Review of Systems:    Review of Systems   Constitutional: Positive for appetite change  Negative for chills and fever  Respiratory: Negative for shortness of breath  Cardiovascular: Negative for chest pain  Gastrointestinal: Positive for abdominal pain and nausea  Negative for diarrhea and vomiting  Musculoskeletal: Negative for arthralgias and back pain  All other systems reviewed and are negative  Past Medical and Surgical History:     Past Medical History:   Diagnosis Date    Diabetes mellitus ([facility] )     Hyperlipidemia     Hypertension        Past Surgical History:   Procedure Laterality Date    HYSTERECTOMY         Meds/Allergies:    Prior to Admission medications    Medication Sig Start Date End Date Taking? Authorizing Provider   glimepiride (AMARYL) 1 mg tablet Take 1 mg by mouth daily with breakfast   Yes Historical Provider, MD   INDAPAMIDE PO Take 1 5 mg by mouth daily after breakfast   Yes Historical Provider, MD   METOPROLOL SUCCINATE PO Take 23 75 mg by mouth daily Combination drug: Telmisartan 40 mg and Metoprolol Succinate 23 75 mg po daily   Yes Historical Provider, MD   NON FORMULARY Take 1 tablet by mouth daily Moreplex CD 1 tablet after breakfast   Yes Historical Provider, MD   pantoprazole (PROTONIX) 40 mg tablet Take 40 mg by mouth daily Combination drug:  Domperidone 30mg (SR) and Pantoprazole 40 mg po daily   Yes Historical Provider, MD   rosuvastatin (CRESTOR) 10 MG tablet Take 10 mg by mouth daily Combination drug: Rosuvastatin 10mg po and Aspirin 75 mg po QHS   Yes Historical Provider, MD   telmisartan (MICARDIS) 40 mg tablet Take 40 mg by mouth daily   Yes Historical Provider, MD     I have reviewed home medications with patient family member  Allergies: No Known Allergies    Social History:     Marital Status: /Civil Union   Occupation: Unknown  Patient Pre-hospital Living Situation: Visiting family in private residence  Patient Pre-hospital Level of Mobility: Independent  Patient Pre-hospital Diet Restrictions: None    Substance Use History:   Social History     Substance and Sexual Activity   Alcohol Use Never    Frequency: Never     Social History     Tobacco Use   Smoking Status Never Smoker   Smokeless Tobacco Never Used     Social History     Substance and Sexual Activity   Drug Use Never       Family History:    non-contributory    Physical Exam:     Vitals:   Blood Pressure: 122/60 (02/13/20 0351)  Pulse: 62 (02/13/20 0351)  Temperature: 97 7 °F (36 5 °C) (02/13/20 0351)  Temp Source: Oral (02/13/20 0351)  Respirations: 18 (02/13/20 0351)  Height: 5' (152 4 cm) (02/13/20 0351)  Weight - Scale: 62 2 kg (137 lb 2 oz) (02/13/20 0351)  SpO2: 97 % (02/13/20 0351)    Physical Exam   Constitutional: She is oriented to person, place, and time  She appears well-developed and well-nourished  HENT:   Head: Normocephalic and atraumatic  Eyes: Conjunctivae are normal    Neck: Neck supple  Cardiovascular: Normal rate, regular rhythm, normal heart sounds and intact distal pulses  Exam reveals no gallop and no friction rub  No murmur heard  Pulmonary/Chest: Effort normal and breath sounds normal  No respiratory distress  She has no wheezes  She has no rales  She exhibits no tenderness  Abdominal: Soft  Bowel sounds are normal  She exhibits no distension  There is tenderness in the right upper quadrant and right lower quadrant  Musculoskeletal: Normal range of motion  Generalized weakness  Neurological: She is alert and oriented to person, place, and time  Skin: Skin is warm and intact  Capillary refill takes 2 to 3 seconds  No rash noted  Nursing note and vitals reviewed  Additional Data:     Lab Results: I have personally reviewed pertinent reports        Results from last 7 days   Lab Units 02/13/20 0441   WBC Thousand/uL 7 17   HEMOGLOBIN g/dL 11 2*   HEMATOCRIT % 33 5*   PLATELETS Thousands/uL 240   NEUTROS PCT % 78*   LYMPHS PCT % 14   MONOS PCT % 5   EOS PCT % 2     Results from last 7 days   Lab Units 02/13/20  0441 02/12/20  2300   SODIUM mmol/L 129* 126*   POTASSIUM mmol/L 3 7 3 3*   CHLORIDE mmol/L 96* 89*   CO2 mmol/L 24 28   BUN mg/dL 6 9   CREATININE mg/dL 0 51* 0 58*   ANION GAP mmol/L 9 9   CALCIUM mg/dL 8 1* 9 1   ALBUMIN g/dL  --  4 0   TOTAL BILIRUBIN mg/dL  --  0 60   ALK PHOS U/L --  82   ALT U/L  --  33   AST U/L  --  17   GLUCOSE RANDOM mg/dL 134 165*     Results from last 7 days   Lab Units 02/12/20  2300   INR  1 08     Results from last 7 days   Lab Units 02/13/20  0501 02/12/20  2331   POC GLUCOSE mg/dl 127 172*         Results from last 7 days   Lab Units 02/12/20  2300   LACTIC ACID mmol/L 0 9       Imaging: I have personally reviewed pertinent reports  CT head without contrast   ED Interpretation by Niesha Oglesby MD (02/13 0124)   FINDINGS:      PARENCHYMA: Decreased attenuation is noted in periventricular and subcortical white matter demonstrating an appearance that is statistically most likely to represent mild microangiopathic change  No CT signs of acute infarction   No intracranial mass, mass effect or midline shift   No acute parenchymal hemorrhage  VENTRICLES AND EXTRA-AXIAL SPACES:  Normal for the patient's age  VISUALIZED ORBITS AND PARANASAL SINUSES:  Unremarkable  CALVARIUM AND EXTRACRANIAL SOFT TISSUES:  Normal    Impression:        No acute intracranial hemorrhage, midline shift, or mass effect  Workstation performed: [id number]         Final Result by Annemarie Vásquez MD (02/13 0122)      No acute intracranial hemorrhage, midline shift, or mass effect  Workstation performed: [id number]         PE Study with CT Abdomen and Pelvis with contrast   ED Interpretation by Niesha Oglesyb MD (02/13 0144)   FINDINGS:      CHEST      PULMONARY ARTERIAL TREE:  No pulmonary embolus is seen  LUNGS:  Lungs are clear   There is no tracheal or endobronchial lesion  PLEURA:  Unremarkable  HEART/AORTA:  Unremarkable for patient's age  MEDIASTINUM AND CORTES:  Unremarkable  CHEST WALL AND LOWER NECK:   Unremarkable  ABDOMEN      LIVER/BILIARY TREE:  Unremarkable  GALLBLADDER:  No calcified gallstones  No pericholecystic inflammatory change  SPLEEN:  Unremarkable        PANCREAS:  Unremarkable  ADRENAL GLANDS:  Unremarkable  KIDNEYS/URETERS:  Unremarkable  No hydronephrosis  STOMACH AND BOWEL:  Stomach is distended with fluid   No bowel obstruction  APPENDIX:  The appendix is mildly prominent measuring 7 mm in diameter   There is mild surrounding fat stranding (series 605, image 65)  ABDOMINOPELVIC CAVITY:  No ascites or free intraperitoneal air   Periportal lymph nodes measure up to 1 cm in short axis  VESSELS:  Unremarkable for patient's age  PELVIS      REPRODUCTIVE ORGANS:  Patient is status post hysterectomy  URINARY BLADDER:  Unremarkable  ABDOMINAL WALL/INGUINAL REGIONS:  Unremarkable  OSSEOUS STRUCTURES:  No acute fracture or destructive osseous lesion  Impression:        1   No evidence of pulmonary embolism   No focal lung consolidation  2   The appendix is mildly prominent measuring 7 mm in diameter   There is mild surrounding fat stranding   Findings are suspicious for early appendicitis        I personally discussed this study with [address] [place] [address] on 2/13/2020 at 1:46 AM          Workstation performed: [id number]         Final Result by [place] MD (02/13 3366)      1  No evidence of pulmonary embolism  No focal lung consolidation  2   The appendix is mildly prominent measuring 7 mm in diameter  There is mild surrounding fat stranding  Findings are suspicious for early appendicitis  I personally discussed this study with [address] [place] [address] on 2/13/2020 at 1:46 AM          Workstation performed: [id number]         XR chest 2 views   ED Interpretation by Majo Tang MD (02/13 9845)   Increased bilateral perihilar markings read by me  EKG, Pathology, and Other Studies Reviewed on Admission:   · EKG: Sinus rhythm, AV block, heart rate 61  Allscripts / Epic Records Reviewed: Yes     ** Please Note: This note has been constructed using a voice recognition system   **

## 2020-02-13 NOTE — OCCUPATIONAL THERAPY NOTE
Occupational Therapy Cancellation Note        Patient Name: Lori Pisano Date: 2/13/2020      OT orders received and chart review completed  Marcy lopez/ Julio César Campbell and RN  Plan for OR for appendectomy  Will continue to follow as appropriate and as schedule allows post operatively      Edwyna Gens, OTR/L

## 2020-02-13 NOTE — ED NOTES
Pt laying on stretcher with HOB elevated  Son-in-law at bedside  Pt now c/o lightheadedness  HR noted to drop to 48-58, Sinus [facility] with arrhythmias noted  Dr Mckenzie Dunham updated/at bedside  No further orders received       Efe Hernandez RN  02/13/20 8504

## 2020-02-13 NOTE — CONSULTS
Consultation - Nephrology   [facility] 71 [facility] MRN: [id number]  Unit/Bed#: S -01 Encounter: [id number]    Consults    History of Present Illness   Physician Requesting Consult: Gardenia Sever, MD  Reason for Consult / Principal Problem:  Hyponatremia  Hx and PE limited by:  Darin but her son translated completely by the bedside    HPI: Jasvir Bocanegra is a 71y o  year old female with history of hypertension/diabetes mellitus type 2/dyslipidemia who presents with a 1 day history of right upper quadrant and lower quadrant abdominal pain  Some nausea but no vomiting  No diarrhea  Chills but no fevers  No shortness of breath or chest pain  No leg swelling  No active urinary symptoms including dysuria hematuria voiding symptoms  We are asked to see the patient because of hyponatremia:  There is no history of hyponatremia according to the son but the patient [place] been in this country [place] approximately 1 [place] half [place]  Only recorded sodiums are 126 at 2300 on 02/12  And 129 from 0441 hours  The patient denies any excessive fluid intake  Only decrease in oral intake with last 24 hours  No NSAID use  She does take and Depo might for hypertension and also Protonix for GI symptoms        History obtained from the patient and the chart which I completely reviewed        General:  Overall feeling well until recent events no fevers or chills  Cardiovascular:  No chest pain shortness of breath or leg swelling  Respiratory:  Minimal nonproductive cough over the last couple months  Gastrointestinal:  See HPI  Genitourinary:  See HPI  Neuro:  Slight headache no other neurologic symptoms  All other systems were reviewed and are negative    Historical Information   Past Medical History:   Diagnosis Date    Diabetes mellitus ([address] )     Hyperlipidemia     Hypertension      Past Surgical History:   Procedure Laterality Date    HYSTERECTOMY       Social History   Social History     Substance and Sexual Activity   Alcohol Use Never    Frequency: Never     Social History     Substance and Sexual Activity   Drug Use Never     Social History     Tobacco Use   Smoking Status Never Smoker   Smokeless Tobacco Never Used     History reviewed  No pertinent family history      Meds/Allergies   all current active meds have been reviewed, current meds:   Current Facility-Administered Medications   Medication Dose Route Frequency    acetaminophen (TYLENOL) tablet 650 mg  650 mg Oral Q6H PRN    ceFAZolin (ANCEF) IVPB (premix) 2,000 mg  2,000 mg Intravenous Q8H    HYDROmorphone (DILAUDID) injection 0 2 mg  0 2 mg Intravenous Q4H PRN    indapamide (LOZOL) tablet 1 25 mg  1 25 mg Oral Daily    influenza vaccine, high-dose (FLUZONE HIGH-DOSE) IM injection NUBIA 0 5 mL  0 5 mL Intramuscular Once    insulin lispro (HumaLOG) 100 units/mL subcutaneous injection 1-5 Units  1-5 Units Subcutaneous Q6H [address]    losartan (COZAAR) tablet 50 mg  50 mg Oral Daily    metoprolol succinate (TOPROL-XL) 24 hr tablet 25 mg  25 mg Oral Daily    metroNIDAZOLE (FLAGYL) IVPB (premix) 500 mg  500 mg Intravenous Q8H    oxyCODONE (ROXICODONE) IR tablet 2 5 mg  2 5 mg Oral Q4H PRN    oxyCODONE (ROXICODONE) IR tablet 5 mg  5 mg Oral Q4H PRN    pantoprazole (PROTONIX) EC tablet 40 mg  40 mg Oral Early Morning    pravastatin (PRAVACHOL) tablet 80 mg  80 mg Oral Daily With Dinner    sodium chloride 0 9 % infusion  100 mL/hr Intravenous Continuous    and PTA meds:    Medications Prior to Admission   Medication    glimepiride (AMARYL) 1 mg tablet    INDAPAMIDE PO    METOPROLOL SUCCINATE PO    NON FORMULARY    pantoprazole (PROTONIX) 40 mg tablet    rosuvastatin (CRESTOR) 10 MG tablet    telmisartan (MICARDIS) 40 mg tablet       No Known Allergies    Objective     Intake/Output Summary (Last 24 hours) at 2/13/2020 0920  Last data filed at 2/13/2020 0538  Gross per 24 hour   Intake 550 ml   Output 900 ml   Net -350 ml     Patient Vitals for the past 24 hrs:   BP Temp Temp src Pulse Resp SpO2 Height Weight   02/13/20 0700 110/60 98 6 °F (37 °C) Oral 60 18 97 %     02/13/20 0351 122/60 97 7 °F (36 5 °C) Oral 62 18 97 % 5' (1 524 m) 62 2 kg (137 lb 2 oz)   02/13/20 0234 144/67   64 16 98 %     02/13/20 0153 140/67 98 7 °F (37 1 °C) Oral 70 16 99 %     02/13/20 0045 129/66   58 16 99 %     02/13/20 0030 129/66   58 17 98 %     02/13/20 0000        62 5 kg (137 lb 12 6 oz)   02/12/20 2340 166/67   62 16 98 %     02/12/20 2241 141/65 98 9 °F (37 2 °C) Oral 66 18 99 %         Invasive Devices:      Vitals:    02/13/20 0700   BP: 110/60   Pulse: 60   Resp: 18   Temp: 98 6 °F (37 °C)   SpO2: 97%     General:  Well-developed well-nourished no acute distress  Skin:  No acute rash  Eyes:  No scleral icterus noninjected  ENT:  Normocephalic/atraumatic, mucous membranes moist  Neck:  Supple, no jugular venous distention, no carotid bruits, 1+ carotid upstroke, no thyromegaly, midline trachea  Back:  No CVA tenderness  Chest:  Clear to auscultation percussion, good respiratory effort, no use of accessory respiratory muscles  CVS:  Regular rate and rhythm without a murmur rub or gallops appreciable  Abdomen:  Normal bowel sounds, soft very minimal tenderness to deep palpation the right upper quadrant, perhaps slight tenderness the right lower quadrant no rebound tenderness no overt masses or hepatosplenomegaly or bruits appreciable  Extremities:  No clubbing, no cyanosis, no edema; 1+ dorsalis pedis pulses and no femoral bruits; no arthritic changes  Neuro:  No gross focality  Psych:  Alert and oriented and appropriate    Current Weight: Weight - Scale: 62 2 kg (137 lb 2 oz)  First Weight: Weight - Scale: 62 5 kg (137 lb 12 6 oz)    Lab Results:  I have personally reviewed pertinent labs    CBC:   Lab Results   Component Value Date    WBC 7 17 02/13/2020    RBC 3 90 02/13/2020     CMP:   Lab Results   Component Value Date    CL 96 (L) 02/13/2020    CO2 24 02/13/2020    BUN 6 02/13/2020    CREATININE 0 51 (L) 02/13/2020    CALCIUM 8 1 (L) 02/13/2020    AST 17 02/12/2020    ALT 33 02/12/2020    ALKPHOS 82 02/12/2020    EGFR 98 02/13/2020     Phosphorus: No results found for: PHOS  Magnesium: No results found for: MG  Urinalysis:   Lab Results   Component Value Date    COLORU Light Yellow 02/13/2020    CLARITYU Clear 02/13/2020    SPECGRAV 1 010 02/13/2020    PHUR 7 5 02/13/2020    LEUKOCYTESUR Negative 02/13/2020    NITRITE Negative 02/13/2020    GLUCOSEU Negative 02/13/2020    KETONESU Negative 02/13/2020    BILIRUBINUR Negative 02/13/2020    BLOODU Trace-Intact (A) 02/13/2020     BMP:   Lab Results   Component Value Date    SODIUM 129 (L) 02/13/2020    CO2 24 02/13/2020    BUN 6 02/13/2020    CREATININE 0 51 (L) 02/13/2020    CALCIUM 8 1 (L) 02/13/2020     Radiology review:  · CT the chest abdomen pelvis and had please see below in the assessment and plan          Assessment/Plan   1  Hyponatremia:  · Differential diagnosis would include use of indepamide(a  thiazide like diuretic), prerenal/pain leading to increase in ADH release/rule out hypothyroidism and adrenal insufficiency  Certainly other causes of SIADH or possible including Protonix  Workup:  · Urine sodium:  80 although this was on a diuretic  · Urine osmolality:  Pending  · Serum osmolality:  Pending  · Uric acid:  3 6 borderline low possibly compatible with SIADH  · TSH:  Normal at 0 846  · A m  Cortisol: To be obtained  · CT of the head:  No acute abnormality  · CT of the chest abdomen pelvis:  Possible appendicitis otherwise no other abnormality  Treatment:  · Fluid restriction 1000 mL per day  · For now isotonic saline with the presumption she may have a mild prerenal component    This is both possible related to the GI symptoms decrease intake and is well the indepamide which can make her prerenal   · AVOID INDEPAMIDE ANOTHER THIAZIDE DIURETICS PERMANENTLY  · Hold Protonix use Pepcid if needed  · Monitor sodium closely    2  Hypertension:  Low normal   Place hold parameters  Stop the indepamide  3  Hypokalemia:  Replete  In addition check magnesium level  Portions of the record may have been created with voice recognition software  Occasional wrong word or "sound a like" substitutions may have occurred due to the inherent limitations of voice recognition software  Read the chart carefully and recognize, using context, where substitutions have occurred

## 2020-02-13 NOTE — ASSESSMENT & PLAN NOTE
No results found for: HGBA1C    Recent Labs     02/12/20  2331 02/13/20  0501   POCGLU 172* 127       Blood Sugar Average: Last 72 hrs:  (P) 149 5   · Home regimen of Amaryl  · Sliding scale insulin and q 6 hour Accu-Cheks

## 2020-02-13 NOTE — ANESTHESIA POSTPROCEDURE EVALUATION
Post-Op Assessment Note    CV Status:  Stable  Pain Score: 0    Pain management: adequate     Mental Status:  Alert and awake   Hydration Status:  Euvolemic   PONV Controlled:  Controlled   Airway Patency:  Patent   Post Op Vitals Reviewed: Yes      Staff: CRNA           /67 (02/13/20 1510)    Temp (!) 97 °F (36 1 °C) (02/13/20 1510)    Pulse 69 (02/13/20 1510)   Resp 17 (02/13/20 1510)    SpO2 100 % (02/13/20 1510)

## 2020-02-13 NOTE — ANESTHESIA PREPROCEDURE EVALUATION
Review of Systems/Medical History  Patient summary reviewed    No history of anesthetic complications     Cardiovascular  EKG reviewed, Exercise tolerance (METS): >4,  Hyperlipidemia, Hypertension , No angina ,    Pulmonary  No shortness of breath, No recent URI ,        GI/Hepatic    No GERD ,   Comment: Appendicitis     Negative  ROS        Endo/Other  Diabetes type 2 ,      GYN       Hematology  Anemia ,     Musculoskeletal  Negative musculoskeletal ROS        Neurology  No seizures ,  No CVA ,    Psychology           Physical Exam    Airway    Mallampati score: II  TM Distance: >3 FB  Neck ROM: full     Dental   No notable dental hx     Cardiovascular      Pulmonary      Other Findings        Anesthesia Plan  ASA Score- 2     Anesthesia Type- general with ASA Monitors  Additional Monitors:   Airway Plan: ETT  Comment: Consent obtained with the help of pt's son-in-law  Pt and family understand the risks of GETA including nausea, sore throat, injury to lips/teeth as well as rare surgical and anesthetic complications        Plan Factors-    Induction- intravenous  Postoperative Plan-     Informed Consent- Anesthetic plan and risks discussed with patient (son in law)  I personally reviewed this patient with the CRNA  Discussed and agreed on the Anesthesia Plan with the CRNA  Jimmy Mckeon

## 2020-02-13 NOTE — OP NOTE
OPERATIVE REPORT  PATIENT NAME: Nidia Thornton    :  1950  MRN: [id number]  Pt Location: AN OR ROOM 02    SURGERY DATE: 2020    Surgeon(s) and Role:     * Luis Angel Alvarenga MD - Primary     * Chaka Alonso PA-C - Assisting as no qualified surgical resident available  Her assistance was needed for exposure and retraction  Preop Diagnosis:  Appendicitis [K37]    Post-Op Diagnosis Codes:     * Appendicitis [K37]   retrocecal    Procedure(s) (LRB):  APPENDECTOMY LAPAROSCOPIC (N/A)    Specimen(s):  ID Type Source Tests Collected by Time Destination   1 :  Tissue Appendix TISSUE EXAM Luis Angel Alvarenga MD 2020 1445        Estimated Blood Loss:   Minimal    Drains:  NG/OG/Enteral Tube Orogastric 18 Fr Right mouth (Active)   Number of days: 0       Anesthesia Type:   General    Operative Indications:  Appendicitis [K37]      Operative Findings:  Independent, nonsmoker, ASA 2, wound class 2, BMI 27, weight 140, height 60  No history of anesthetic complications     Cardiovascular  EKG reviewed, Exercise tolerance (METS): >4,  Hyperlipidemia, Hypertension , No angina ,     Pulmonary  No shortness of breath, No recent URI ,          GI/Hepatic     No GERD ,   Comment: Appendicitis      Negative  ROS          Endo/Other  Diabetes type 2 ,        GYN         Hematology  Anemia ,       Musculoskeletal  Negative musculoskeletal ROS          Neurology  No seizures ,  No CVA ,     Psychology              Complications:   None    Procedure and Technique:  The patient was identified visually and via armband  Placed in the supine position  After general anesthesia the abdomen was prepped and draped in a sterile fashion  0 25% Marcaine with epinephrine was utilized throughout the procedure  Incision was made at the umbilicus  This carried down through skin subcutaneous tissue  Under direct vision a 12 mm trocar was then inserted  This followed by CO2 insufflation with a back pressure 15   Two additional midline 5 mm ports were then placed  Laparoscopic visualization revealed adhesions secondary to previous hysterectomy  These were tick particularly prominent in the cecal region  The retroperitoneal adhesions were then divided freeing up both the ileum ileocecal valve and cecum  It was not overtly obvious initially where the appendix was located  Appendix is father located in the retroperitoneum extending superiorly towards the gallbladder  This was pulled upwardly  The mesoappendix was divided  The base of the appendix was divided using a Endo-MARLA stapling device  The areas irrigated  Aspirated dry  Hemostasis was assured  The appendix vision was the umbilicus  Fascia was then closed with 0 Vicryl suture followed by 4 Monocryl suture and Dermabond  Patient tolerated this procedure well  Sponge instrument count correct x2     I was present for the entire procedure    Patient Disposition:  PACU     SIGNATURE: Miriam Collazo MD  DATE: February 13, 2020  TIME: 3:13 PM

## 2020-02-13 NOTE — UTILIZATION REVIEW
Initial Clinical Review    Admission: Date/Time/Statement: Admission Orders (From admission, onward)     Ordered        02/13/20 0231  Inpatient Admission  Once                   Orders Placed This Encounter   Procedures    Inpatient Admission     Standing Status:   Standing     Number of Occurrences:   1     Order Specific Question:   Admitting Physician     Answer:   Fawn Smith [[id number]]     Order Specific Question:   Level of Care     Answer:   Med Surg [[id number]]     Order Specific Question:   Estimated length of stay     Answer:   More than 2 Midnights     Order Specific Question:   Certification     Answer:   I certify that inpatient services are medically necessary for this patient for a duration of greater than two midnights  See H&P and MD Progress Notes for additional information about the patient's course of treatment  ED Arrival Information     Expected Arrival Acuity Means of Arrival Escorted By Service Admission Type    - 2/12/2020 22:34 Urgent Walk-In Family Member Hospitalist Urgent    Arrival Complaint    ABNORMAL LABS        Chief Complaint   Patient presents with    Abdominal Pain     Presents for eval of of RUQ abd pain since this AM  Seen at Urgent care and bloodwork completed  NA+ 125mg/dl  Reports loss of appetite, nausea, and intermittent diffuse abd pain, with constant sharp, stabbing pain to right   Abnormal Lab     Assessment/Plan: 72 yo Victorino speaking female in the [address] visiting for 3 months, pmhx htn, dm to ED from family's home admitted as inpatient due to hyponatremia likely from dehydration, and acute appendicitis  Presented with R abd pain, poor appetite, and nausea  She was seen at urgent care 1d pta due to worsening R abd pain and nausea  Na was 125 there, and referral made to ED  Pt did not want to go, and did not present until 2/12  On arrival to ED, na 126  Exam reveals  RUQ/RLQ abd tenderness and generalized weakness    CT showed early appendicitis  q6h chem testing, IVF, double IV antbx, analgesics in progress, renal and surgery consulted  Keeping NPO      2/13 per surgery: keeping NPO, pain is improving, IVF and IV antbx continue  Taken to OR @8472 for laparoscopic appendectomy, which is currently underway  2/13 per renal: workup for hyponatremia is in progress, await urine/serum osmo  Cortisol to be checked in am tomorrow  Differential includes med induced by thiazide diuretic, prerenal leading to increased ADH from pain; hypothyroid, or adrenal insufficiency/SIADH  Fluid restriction to 1liter daily-isotonic saline running currently due to possible prerenal cause  Could be related to GI sx and indepamide, which she was on for HTN  Need to avoid all thiazide diuretics permanently  Also hypokalemic, repleting and checking mg  ED Triage Vitals [02/12/20 2241]   Temperature Pulse Respirations Blood Pressure SpO2   98 9 °F (37 2 °C) 66 18 141/65 99 %      Temp Source Heart Rate Source Patient Position - Orthostatic VS BP Location FiO2 (%)   Oral Monitor Sitting Right arm --      Pain Score       7        Wt Readings from Last 1 Encounters:   02/13/20 62 2 kg (137 lb 2 oz)     Additional Vital Signs:   Date/Time  Temp  Pulse  Resp  BP   SpO2   02/13/20 0700  98 6 °F (37 °C)  60  18  110/60   97 %   02/13/20 0351  97 7 °F (36 5 °C)  62  18  122/60   97 %   02/13/20 0234    64  16  144/67   98 %   02/13/20 0153  98 7 °F (37 1 °C)  70  16  140/67   99 %   02/13/20 0045    58  16  129/66   99 %   02/13/20 0030    58  17  129/66   98 %   02/12/20 2340    62  16  166/67   98 %       Pertinent Labs/Diagnostic Test Results:   2/12 EKG: sinus arrhythmia, 1st degree av block, inverted t's v1/v2  2/13 CXR: nothing acute  2/13 CT head: nothing acute  2/13 CTA chest a&p: 1  No evidence of pulmonary embolism  No focal lung consolidation  2   The appendix is mildly prominent measuring 7 mm in diameter  There is mild surrounding fat stranding    Findings are suspicious for early appendicitis      Results from last 7 days   Lab Units 02/13/20  0441 02/12/20  2300   WBC Thousand/uL 7 17 9 39   HEMOGLOBIN g/dL 11 2* 12 6   HEMATOCRIT % 33 5* 36 9   PLATELETS Thousands/uL 240 292   NEUTROS ABS Thousands/µL 5 55 7 22         Results from last 7 days   Lab Units 02/13/20  1002 02/13/20  0441 02/12/20  2300   SODIUM mmol/L 133* 129* 126*   POTASSIUM mmol/L 3 9 3 7 3 3*   CHLORIDE mmol/L 98* 96* 89*   CO2 mmol/L 27 24 28   ANION GAP mmol/L 8 9 9   BUN mg/dL 5 6 9   CREATININE mg/dL 0 56* 0 51* 0 58*   EGFR ml/min/1 73sq m 95 98 94   CALCIUM mg/dL 8 7 8 1* 9 1     Results from last 7 days   Lab Units 02/12/20  2300   AST U/L 17   ALT U/L 33   ALK PHOS U/L 82   TOTAL PROTEIN g/dL 7 9   ALBUMIN g/dL 4 0   TOTAL BILIRUBIN mg/dL 0 60     Results from last 7 days   Lab Units 02/13/20  1147 02/13/20  0657 02/13/20  0501 02/12/20  2331   POC GLUCOSE mg/dl 124 113 127 172*     Results from last 7 days   Lab Units 02/13/20  1002 02/13/20  0441 02/12/20  2300   GLUCOSE RANDOM mg/dL 110 134 165*     Results from last 7 days   Lab Units 02/13/20  0441   OSMOLALITY, SERUM mmol/*     Results from last 7 days   Lab Units 02/13/20  0441   HEMOGLOBIN A1C % 6 3*   EAG mg/dl 134     BETA-HYDROXYBUTYRATE   Date Value Ref Range Status   02/12/2020 0 1 <0 6 mmol/L Final      Results from last 7 days   Lab Units 02/12/20  2300   PH TI  7 406*   PCO2 TI mm Hg 43 1   PO2 TI mm Hg 40 8   HCO3 TI mmol/L 26 5   BASE EXC TI mmol/L 1 5   O2 CONTENT TI ml/dL 14 7   O2 HGB, VENOUS % 76 7     Results from last 7 days   Lab Units 02/12/20  2300   CK TOTAL U/L 92     Results from last 7 days   Lab Units 02/12/20  2300   TROPONIN I ng/mL <0 02     Results from last 7 days   Lab Units 02/12/20  2300   D-DIMER QUANTITATIVE ug/ml FEU 0 54*     Results from last 7 days   Lab Units 02/12/20  2300   PROTIME seconds 13 4   INR  1 08   PTT seconds 34     Results from last 7 days   Lab Units 02/12/20  2300   TSH 3RD GENERATON uIU/mL 0 846     Results from last 7 days   Lab Units 02/12/20  2300   LACTIC ACID mmol/L 0 9     Results from last 7 days   Lab Units 02/12/20  2300   NT-PRO BNP pg/mL 120     Results from last 7 days   Lab Units 02/12/20  2300   LIPASE u/L 85     Results from last 7 days   Lab Units 02/13/20  0201   CLARITY UA  Clear   COLOR UA  Light Yellow   SPEC GRAV UA  1 010   PH UA  7 5   GLUCOSE UA mg/dl Negative   KETONES UA mg/dl Negative   BLOOD UA  Trace-Intact*   PROTEIN UA mg/dl Negative   NITRITE UA  Negative   BILIRUBIN UA  Negative   UROBILINOGEN UA E U /dl 0 2   LEUKOCYTES UA  Negative   WBC UA /hpf None Seen   RBC UA /hpf 0-1*   BACTERIA UA /hpf None Seen   EPITHELIAL CELLS WET PREP /hpf Occasional   SODIUM UR  80     Results from last 7 days   Lab Units 02/12/20  2305   INFLUENZA A PCR  None Detected   INFLUENZA B PCR  None Detected   RSV PCR  None Detected     Results from last 7 days   Lab Units 02/12/20  2330 02/12/20  2300   BLOOD CULTURE  Received in Microbiology Lab  Culture in Progress  Received in Microbiology Lab  Culture in Progress       ED Treatment:   Medication Administration from 02/12/2020 2233 to 02/13/2020 7247       Date/Time Order Dose Route Action     02/12/2020 2335 sodium chloride 0 9 % bolus 500 mL 500 mL Intravenous New Bag     02/12/2020 2335 ondansetron (ZOFRAN) injection 4 mg 4 mg Intravenous Given     02/12/2020 2338 HYDROmorphone (DILAUDID) injection 0 5 mg 0 5 mg Intravenous Given     02/13/2020 0031 sodium chloride 0 9 % infusion 125 mL/hr Intravenous New Bag     02/13/2020 0040 potassium chloride (K-DUR,KLOR-CON) CR tablet 20 mEq 20 mEq Oral Given     02/13/2020 0200 ceFAZolin (ANCEF) IVPB (premix) 1,000 mg 1,000 mg Intravenous New Bag     02/13/2020 0236 metroNIDAZOLE (FLAGYL) IVPB (premix) 500 mg 500 mg Intravenous New Bag        Past Medical History:   Diagnosis Date    Diabetes mellitus ([facility] [address] )     Hyperlipidemia     Hypertension          Admitting Diagnosis: Hyponatremia [E87 1]  Appendicitis [K37]  Abdominal pain [R10 9]  Age/Sex: 71 y o  female  Admission Orders:  Scheduled Medications:  Medications:  cefazolin 2,000 mg Intravenous Q8H   influenza vaccine 0 5 mL Intramuscular Once   insulin lispro 1-5 Units Subcutaneous Q6H [facility]   [START ON 2/14/2020] losartan 50 mg Oral Daily   [START ON 2/14/2020] metoprolol succinate 25 mg Oral Daily   metroNIDAZOLE 500 mg Intravenous Q8H   pravastatin 80 mg Oral Daily With Dinner     Continuous IV Infusions:  sodium chloride 75 mL/hr Intravenous Continuous     PRN Meds:  acetaminophen 650 mg Oral Q6H PRN   HYDROmorphone 0 2 mg Intravenous Q4H PRN   oxyCODONE 2 5 mg Oral Q4H PRN x 1 on 2/13   oxyCODONE 5 mg Oral Q4H PRN     SCD's  NPO    IP CONSULT TO ACUTE CARE SURGERY  IP CONSULT TO NEPHROLOGY    Network Utilization Review Department  [email or web address]  ATTENTION: Please call with any questions or concerns to [phone] and carefully listen to the prompts so that you are directed to the right person  All voicemails are confidential   Reji Chen all requests for admission clinical reviews, approved or denied determinations and any other requests to dedicated fax number below belonging to the campus where the patient is receiving treatment   List of dedicated fax numbers for the Facilities:  [address] DENIALS (Administrative/Medical Necessity) [phone]   [address] (Maternity/NICU/Pediatrics) [phone]   Margaux Hunt [phone]   Larry Leigh [phone]   Marc Jackson [phone]   Vanessa Emery [phone]   [address] [place] [address] [phone]   [facility]  [phone]   [address] [facility] [address]  [address] [facility] [address] [facility] [phone]

## 2020-02-13 NOTE — ED PROVIDER NOTES
History  Chief Complaint   Patient presents with    Abdominal Pain     Presents for eval of of RUQ abd pain since this AM  Seen at Urgent care and bloodwork completed  NA+ 125mg/dl  Reports loss of appetite, nausea, and intermittent diffuse abd pain, with constant sharp, stabbing pain to right   Abnormal Lab     Patient is a 71year old female with 1 day of worsening right sided abdominal pain which is pleuritic  Felt feverish and chills  (+) nausea  No vomiting or diarrhea  Has had prior hysterectomy  Went to [facility] and Na was 125 and patient was sent to ED for further evaluation  No GI bleeding  No urinary sx  Has had intermittent R flank pain as well  No travel  No recent old records from this ED seen on computer system  [facility] website checked on this patient and no Rx found  Decreased appetite  History provided by:  Patient and relative (son in law)  Abdominal Pain   Associated symptoms: chills, cough, fever (felt feverish) and nausea    Associated symptoms: no diarrhea and no vomiting        None       Past Medical History:   Diagnosis Date    Diabetes mellitus ([facility] [address] )     Hyperlipidemia     Hypertension        Past Surgical History:   Procedure Laterality Date    HYSTERECTOMY         History reviewed  No pertinent family history  I have reviewed and agree with the history as documented  Social History     Tobacco Use    Smoking status: Never Smoker    Smokeless tobacco: Never Used   Substance Use Topics    Alcohol use: Never     Frequency: Never    Drug use: Never       Review of Systems   Constitutional: Positive for appetite change, chills and fever (felt feverish)  Respiratory: Positive for cough  Gastrointestinal: Positive for abdominal pain and nausea  Negative for diarrhea and vomiting  Genitourinary: Negative for difficulty urinating  All other systems reviewed and are negative        Physical Exam  Physical Exam   Constitutional: She is oriented to person, place, and time  She appears well-developed and well-nourished  She appears distressed (moderate)  HENT:   Head: Normocephalic and atraumatic  Mucous membranes somewhat moist     Eyes: No scleral icterus  Neck: No JVD present  No tracheal deviation present  Cardiovascular: Normal rate, regular rhythm and normal heart sounds  No murmur heard  Pulmonary/Chest: Effort normal and breath sounds normal  No stridor  No respiratory distress  She has no wheezes  She has no rales  She exhibits no tenderness  Abdominal: Soft  Bowel sounds are normal  She exhibits no distension  There is tenderness (RUQ and RLQ)  There is no rebound and no guarding  R CVAT  Musculoskeletal: She exhibits no edema or deformity  Neurological: She is alert and oriented to person, place, and time  Skin: Skin is warm and dry  No rash noted  Psychiatric: She has a normal mood and affect  Nursing note and vitals reviewed        Vital Signs  ED Triage Vitals [02/12/20 2241]   Temperature Pulse Respirations Blood Pressure SpO2   98 9 °F (37 2 °C) 66 18 141/65 99 %      Temp Source Heart Rate Source Patient Position - Orthostatic VS BP Location FiO2 (%)   Oral Monitor Sitting Right arm --      Pain Score       7           Vitals:    02/12/20 2340 02/13/20 0030 02/13/20 0045 02/13/20 0153   BP: 166/67 129/66 129/66 140/67   Pulse: 62 58 58 70   Patient Position - Orthostatic VS: Lying Lying Lying Lying         Visual Acuity      ED Medications  Medications   sodium chloride 0 9 % infusion (125 mL/hr Intravenous New Bag 2/13/20 0031)   metroNIDAZOLE (FLAGYL) IVPB (premix) 500 mg (has no administration in time range)   sodium chloride 0 9 % bolus 500 mL (0 mL Intravenous Stopped 2/13/20 0030)   ondansetron (ZOFRAN) injection 4 mg (4 mg Intravenous Given 2/12/20 2335)   HYDROmorphone (DILAUDID) injection 0 5 mg (0 5 mg Intravenous Given 2/12/20 2338)   potassium chloride (K-DUR,KLOR-CON) CR tablet 20 mEq (20 mEq Oral Given 2/13/20 0040)   iohexol (OMNIPAQUE) 350 MG/ML injection (MULTI-DOSE) 100 mL (100 mL Intravenous Given 2/13/20 0111)   ceFAZolin (ANCEF) IVPB (premix) 1,000 mg (1,000 mg Intravenous New Bag 2/13/20 0200)       Diagnostic Studies  Results Reviewed     Procedure Component Value Units Date/Time    Urine Microscopic [[id number]]  (Abnormal) Collected:  02/13/20 0201    Lab Status:  Final result Specimen:  Urine, Clean Catch Updated:  02/13/20 0228     RBC, UA 0-1 /hpf      WBC, UA None Seen /hpf      Epithelial Cells Occasional /hpf      Bacteria, UA None Seen /hpf     UA w Reflex to Microscopic w Reflex to Culture [[id number]]  (Abnormal) Collected:  02/13/20 0201    Lab Status:  Final result Specimen:  Urine, Clean Catch Updated:  02/13/20 0208     Color, UA Light Yellow     Clarity, UA Clear     Specific Gravity, UA 1 010     pH, UA 7 5     Leukocytes, UA Negative     Nitrite, UA Negative     Protein, UA Negative mg/dl      Glucose, UA Negative mg/dl      Ketones, UA Negative mg/dl      Urobilinogen, UA 0 2 E U /dl      Bilirubin, UA Negative     Blood, UA Trace-Intact    Influenza A/B and RSV PCR [[id number]]  (Normal) Collected:  02/12/20 2305    Lab Status:  Final result Specimen:  Nasopharyngeal from Nose Updated:  02/13/20 0033     INFLUENZA A PCR None Detected     INFLUENZA B PCR None Detected     RSV PCR None Detected    Lipase [[id number]]  (Normal) Collected:  02/12/20 2300    Lab Status:  Final result Specimen:  Blood from Arm, Left Updated:  02/13/20 0011     Lipase 85 u/L     CK Total with Reflex CKMB [[id number]]  (Normal) Collected:  02/12/20 2300    Lab Status:  Final result Specimen:  Blood from Arm, Left Updated:  02/13/20 0011     Total CK 92 U/L     NT-BNP PRO [[id number]]  (Normal) Collected:  02/12/20 2300    Lab Status:  Final result Specimen:  Blood from Arm, Left Updated:  02/13/20 0011     NT-proBNP 120 pg/mL     Lactic acid, plasma x2 [[id number]]  (Normal) Collected:  02/12/20 2300    Lab Status:  Final result Specimen:  Blood from Arm, Left Updated:  02/13/20 0009     LACTIC ACID 0 9 mmol/L     Narrative:       Result may be elevated if tourniquet was used during collection      Troponin I [[id number]]  (Normal) Collected:  02/12/20 2300    Lab Status:  Final result Specimen:  Blood from Arm, Left Updated:  02/13/20 0008     Troponin I <0 02 ng/mL     Comprehensive metabolic panel [[id number]]  (Abnormal) Collected:  02/12/20 2300    Lab Status:  Final result Specimen:  Blood from Arm, Left Updated:  02/13/20 0006     Sodium 126 mmol/L      Potassium 3 3 mmol/L      Chloride 89 mmol/L      CO2 28 mmol/L      ANION GAP 9 mmol/L      BUN 9 mg/dL      Creatinine 0 58 mg/dL      Glucose 165 mg/dL      Calcium 9 1 mg/dL      AST 17 U/L      ALT 33 U/L      Alkaline Phosphatase 82 U/L      Total Protein 7 9 g/dL      Albumin 4 0 g/dL      Total Bilirubin 0 60 mg/dL      eGFR 94 ml/min/1 73sq m     Narrative:       [place] guidelines for Chronic Kidney Disease (CKD):     Stage 1 with normal or high GFR (GFR > 90 mL/min/1 73 square meters)    Stage 2 Mild CKD (GFR = 60-89 mL/min/1 73 square meters)    Stage 3A Moderate CKD (GFR = 45-59 mL/min/1 73 square meters)    Stage 3B Moderate CKD (GFR = 30-44 mL/min/1 73 square meters)    Stage 4 Severe CKD (GFR = 15-29 mL/min/1 73 square meters)    Stage 5 End Stage CKD (GFR <15 mL/min/1 73 square meters)  Note: GFR calculation is accurate only with a steady state creatinine    D-Dimer [[id number]]  (Abnormal) Collected:  02/12/20 2300    Lab Status:  Final result Specimen:  Blood from Arm, Left Updated:  02/13/20 0006     D-Dimer, Quant 0 54 ug/ml FEU     Protime-INR [[id number]]  (Normal) Collected:  02/12/20 2300    Lab Status:  Final result Specimen:  Blood from Arm, Left Updated:  02/13/20 0000     Protime 13 4 seconds      INR 1 08    APTT [[id number]]  (Normal) Collected:  02/12/20 2300    Lab Status:  Final result Specimen:  Blood from Arm, Left Updated:  02/13/20 0000     PTT 34 seconds     Beta Hydroxybutyrate [[id number]]  (Normal) Collected:  02/12/20 2300    Lab Status:  Final result Specimen:  Blood from Arm, Left Updated:  02/12/20 2359     BETA-HYDROXYBUTYRATE 0 1 mmol/L     Blood gas, venous [[id number]]  (Abnormal) Collected:  02/12/20 2300    Lab Status:  Final result Specimen:  Blood from Arm, Left Updated:  02/12/20 2351     pH, Mario 7 406     pCO2, Mario 43 1 mm Hg      pO2, Mario 40 8 mm Hg      HCO3, Mario 26 5 mmol/L      Base Excess, Mario 1 5 mmol/L      O2 Content, Mario 14 7 ml/dL      O2 HGB, VENOUS 76 7 %     CBC and differential [[id number]]  (Abnormal) Collected:  02/12/20 2300    Lab Status:  Final result Specimen:  Blood from Arm, Left Updated:  02/12/20 2350     WBC 9 39 Thousand/uL      RBC 4 42 Million/uL      Hemoglobin 12 6 g/dL      Hematocrit 36 9 %      MCV 84 fL      MCH 28 5 pg      MCHC 34 1 g/dL      RDW 12 9 %      MPV 10 8 fL      Platelets 601 Thousands/uL      nRBC 0 /100 WBCs      Neutrophils Relative 76 %      Immat GRANS % 0 %      Lymphocytes Relative 15 %      Monocytes Relative 5 %      Eosinophils Relative 3 %      Basophils Relative 1 %      Neutrophils Absolute 7 22 Thousands/µL      Immature Grans Absolute 0 03 Thousand/uL      Lymphocytes Absolute 1 37 Thousands/µL      Monocytes Absolute 0 46 Thousand/µL      Eosinophils Absolute 0 26 Thousand/µL      Basophils Absolute 0 05 Thousands/µL     Blood culture #1 [[id number]] Collected:  02/12/20 2300    Lab Status: In process Specimen:  Blood from Arm, Left Updated:  02/12/20 2348    Blood culture #2 [[id number]] Collected:  02/12/20 2330    Lab Status:   In process Specimen:  Blood from Hand, Right Updated:  02/12/20 2348    Fingerstick Glucose (POCT) [[id number]]  (Abnormal) Collected:  02/12/20 2331    Lab Status:  Final result Updated:  02/12/20 2336     POC Glucose 172 mg/dl     Lactic acid, plasma x2 [[id number]]     Lab Status:  No result Specimen:  Blood CT head without contrast   ED Interpretation by Milagros Anders MD (02/13 0124)   FINDINGS:      PARENCHYMA: Decreased attenuation is noted in periventricular and subcortical white matter demonstrating an appearance that is statistically most likely to represent mild microangiopathic change  No CT signs of acute infarction   No intracranial mass, mass effect or midline shift   No acute parenchymal hemorrhage  VENTRICLES AND EXTRA-AXIAL SPACES:  Normal for the patient's age  VISUALIZED ORBITS AND PARANASAL SINUSES:  Unremarkable  CALVARIUM AND EXTRACRANIAL SOFT TISSUES:  Normal    Impression:        No acute intracranial hemorrhage, midline shift, or mass effect  Workstation performed: [id number]         Final Result by Noe Gonzalez MD (02/13 0122)      No acute intracranial hemorrhage, midline shift, or mass effect  Workstation performed: [id number]         PE Study with CT Abdomen and Pelvis with contrast   ED Interpretation by Milagros Anders MD (02/13 0149)   FINDINGS:      CHEST      PULMONARY ARTERIAL TREE:  No pulmonary embolus is seen  LUNGS:  Lungs are clear   There is no tracheal or endobronchial lesion  PLEURA:  Unremarkable  HEART/AORTA:  Unremarkable for patient's age  MEDIASTINUM AND CORTES:  Unremarkable  CHEST WALL AND LOWER NECK:   Unremarkable  ABDOMEN      LIVER/BILIARY TREE:  Unremarkable  GALLBLADDER:  No calcified gallstones  No pericholecystic inflammatory change  SPLEEN:  Unremarkable  PANCREAS:  Unremarkable  ADRENAL GLANDS:  Unremarkable  KIDNEYS/URETERS:  Unremarkable  No hydronephrosis  STOMACH AND BOWEL:  Stomach is distended with fluid   No bowel obstruction  APPENDIX:  The appendix is mildly prominent measuring 7 mm in diameter   There is mild surrounding fat stranding (series 605, image 65)        ABDOMINOPELVIC CAVITY:  No ascites or free intraperitoneal air   Periportal lymph nodes measure up to 1 cm in short axis  VESSELS:  Unremarkable for patient's age  PELVIS      REPRODUCTIVE ORGANS:  Patient is status post hysterectomy  URINARY BLADDER:  Unremarkable  ABDOMINAL WALL/INGUINAL REGIONS:  Unremarkable  OSSEOUS STRUCTURES:  No acute fracture or destructive osseous lesion  Impression:        1   No evidence of pulmonary embolism   No focal lung consolidation  2   The appendix is mildly prominent measuring 7 mm in diameter   There is mild surrounding fat stranding   Findings are suspicious for early appendicitis        I personally discussed this study with [address] [place] [address] on 2/13/2020 at 1:46 AM          Workstation performed: [id number]         Final Result by Almas Beard MD (02/13 0146)      1  No evidence of pulmonary embolism  No focal lung consolidation  2   The appendix is mildly prominent measuring 7 mm in diameter  There is mild surrounding fat stranding  Findings are suspicious for early appendicitis  I personally discussed this study with [address] [place] [address] on 2/13/2020 at 1:46 AM          Workstation performed: [id number]         XR chest 2 views   ED Interpretation by Padmini Orellana MD (02/13 0015)   Increased bilateral perihilar markings read by me                    Procedures  ECG 12 Lead Documentation Only  Date/Time: 2/12/2020 11:38 PM  Performed by: Padmini Orellana MD  Authorized by: Padmini Orellana MD     Indications / Diagnosis:  Abdominal pain  ECG reviewed by me, the ED Provider: yes    Patient location:  ED  Previous ECG:     Previous ECG:  Unavailable  Quality:     Tracing quality:  Limited by artifact  Rate:     ECG rate:  61    ECG rate assessment: normal    Rhythm:     Rhythm: sinus rhythm and A-V block      Rhythm comment:  S  arrhythmia  Ectopy:     Ectopy: none    QRS:     QRS axis:  Normal  Conduction:     Conduction: abnormal      Abnormal conduction: 1st degree    ST segments: ST segments:  Normal  T waves:     T waves: inverted      Inverted:  V1 and V2             ED Course  ED Course as of Feb 13 0231   Thu Feb 13, 2020   0018 Labs, CXR, EKG d/w patient and son in law  Pain improved  CTs ordered  0021 K-dur po ordered  Potassium(!): 3 3   0149 CTs d/w patient and son in law  IV abx ordered  0557 D/w surgical resident for Dr Swathi Hernandez for surgical consult and he wants admission to [facility] service for hyponatremia workup  HEART Risk Score      Most Recent Value   History  0 Filed at: 02/13/2020 0018   ECG  0 Filed at: 02/13/2020 0018   Age  2 Filed at: 02/13/2020 0018   Risk Factors  2 Filed at: 02/13/2020 0018   Troponin  0 Filed at: 02/13/2020 0018   Heart Score Risk Calculator   History  0 Filed at: 02/13/2020 0018   ECG  0 Filed at: 02/13/2020 0018   Age  2 Filed at: 02/13/2020 0018   Risk Factors  2 Filed at: 02/13/2020 0018   Troponin  0 Filed at: 02/13/2020 0018   HEART Score  4 Filed at: 02/13/2020 0018   HEART Score  4 Filed at: 02/13/2020 0018            PERC Rule for PE      Most Recent Value   PERC Rule for PE   Age >=50  1 Filed at: 02/13/2020 0013   HR >=100     O2 Sat on room air < 95%     History of PE or DVT     Recent trauma or surgery     Hemoptysis     Exogenous estrogen     Unilateral leg swelling     PERC Rule for PE Results  1 Filed at: 02/13/2020 0013          Initial Sepsis Screening     Row Name 02/13/20 0015                Is the patient's history suggestive of a new or worsening infection? (!) Yes (Proceed)  -AO        Suspected source of infection  pneumonia;acute abdominal infection  -AO        Are two or more of the following signs & symptoms of infection both present and new to the patient?   No  -AO        Indicate SIRS criteria          If the answer is yes to both questions, suspicion of sepsis is present          If severe sepsis is present AND tissue hypoperfusion perists in the hour after fluid resuscitation or lactate > 4, the patient meets criteria for SEPTIC SHOCK          Are any of the following organ dysfunction criteria present within 6 hours of suspected infection and SIRS criteria that are NOT considered to be chronic conditions?         Organ dysfunction          Date of presentation of severe sepsis          Time of presentation of severe sepsis          Tissue hypoperfusion persists in the hour after crystalloid fluid administration, evidenced, by either:          Was hypotension present within one hour of the conclusion of crystalloid fluid administration?           Date of presentation of septic shock          Time of presentation of septic shock            User Key  (r) = Recorded By, (t) = Taken By, (c) = Cosigned By    [address] Name Provider Niko Barnett MD Physician            [place]' Criteria for PE      Most Recent Value   Len' Criteria for PE   Clinical signs and symptoms of DVT  0 Filed at: 02/13/2020 0013   PE is primary diagnosis or equally likely  3 Filed at: 02/13/2020 0013   HR >100  0 Filed at: 02/13/2020 0013   Immobilization at least 3 days or Surgery in the previous 4 weeks  0 Filed at: 02/13/2020 0013   Previous, objectively diagnosed PE or DVT  0 Filed at: 02/13/2020 0013   Hemoptysis  0 Filed at: 02/13/2020 0013   Malignancy with treatment within 6 months or palliative  0 Filed at: 02/13/2020 0013   Wells' Criteria Total  3 Filed at: 02/13/2020 0013            MDM  Number of Diagnoses or Management Options  Diagnosis management comments: DDx including but not limited to: appendicitis, gastroenteritis, gastritis, PUD, GERD, gastroparesis, hepatitis, pancreatitis, colitis, enteritis, diverticulitis, food poisoning, mesenteric adenitis, mesenteric ischemia, IBD, IBS, ileus, bowel obstruction, volvulus, internal hernia, AAA, cholecystitis, biliary colic, choledocholithiasis, perforated viscus, tumor, splenic etiology, constipation, pelvic pathology, renal colic, pyelonephritis, UTI; doubt cardiac etiology  DDx including but not limited to: hyponatremia, hypernatremia, dehydration, fluid overload, intrathoracic etiology, intracranial etiology, other metabolic abnormality, renal failure, adverse reaction  Amount and/or Complexity of Data Reviewed  Clinical lab tests: ordered and reviewed  Tests in the radiology section of CPT®: ordered and reviewed  Decide to obtain previous medical records or to obtain history from someone other than the patient: yes  Obtain history from someone other than the patient: yes  Discuss the patient with other providers: yes  Independent visualization of images, tracings, or specimens: yes          Disposition  Final diagnoses:   Appendicitis   Hyponatremia     Time reflects when diagnosis was documented in both MDM as applicable and the Disposition within this note     Time User Action Codes Description Comment    2/13/2020  1:52 AM Maryann, [address] Appendicitis     2/13/2020  1:53 AM Gene Garcia Add [E87 1] Hyponatremia       ED Disposition     ED Disposition Condition Date/Time Comment    Admit Stable Thu Feb 13, 2020  2:31 AM Case was discussed with KEVIN Oswald  and the patient's admission status was agreed to be Admission Status: inpatient status to the service of Dr Lydia Gonazles   Follow-up Information    None         Patient's Medications    No medications on file     No discharge procedures on file      PDMP Review       Value Time User    PDMP Reviewed  Yes 2/12/2020 11:14 PM Simone Bourne MD          ED Provider  Electronically Signed by           Simone Bourne MD  02/13/20 0908

## 2020-02-13 NOTE — PHYSICAL THERAPY NOTE
PHYSICAL THERAPY CANCELLATION NOTE    Patient Name: Nisreen Lobo Date: 2/13/2020     PT orders received, chart review performed  Spoke w/ DES Anna  Pt is scheduled for an appendectomy  Will continue to follow up post-op as schedule allows      Barry Cartagena, PT, DPT

## 2020-02-13 NOTE — PLAN OF CARE
Problem: Nutrition/Hydration-ADULT  Goal: Nutrient/Hydration intake appropriate for improving, restoring or maintaining nutritional needs  Description  Monitor and assess patient's nutrition/hydration status for malnutrition  Collaborate with interdisciplinary team and initiate plan and interventions as ordered  Monitor patient's weight and dietary intake as ordered or per policy  Utilize nutrition screening tool and intervene as necessary  Determine patient's food preferences and provide high-protein, high-caloric foods as appropriate       INTERVENTIONS:  - Monitor oral intake, urinary output, labs, and treatment plans  - Assess nutrition and hydration status and recommend course of action  - Evaluate amount of meals eaten  - Assist patient with eating if necessary   - Allow adequate time for meals  - Recommend/ encourage appropriate diets, oral nutritional supplements, and vitamin/mineral supplements  - Order, calculate, and assess calorie counts as needed  - Recommend, monitor, and adjust tube feedings and TPN/PPN based on assessed needs  - Assess need for intravenous fluids  - Provide specific nutrition/hydration education as appropriate  - Include patient/family/caregiver in decisions related to nutrition  Outcome: Progressing     Problem: Prexisting or High Potential for Compromised Skin Integrity  Goal: Skin integrity is maintained or improved  Description  INTERVENTIONS:  - Identify patients at risk for skin breakdown  - Assess and monitor skin integrity  - Assess and monitor nutrition and hydration status  - Monitor labs   - Assess for incontinence   - Turn and reposition patient  - Assist with mobility/ambulation  - Relieve pressure over bony prominences  - Avoid friction and shearing  - Provide appropriate hygiene as needed including keeping skin clean and dry  - Evaluate need for skin moisturizer/barrier cream  - Collaborate with interdisciplinary team   - Patient/family teaching  - Consider wound care consult   Outcome: Progressing     Problem: Potential for Falls  Goal: Patient will remain free of falls  Description  INTERVENTIONS:  - Assess patient frequently for physical needs  -  Identify cognitive and physical deficits and behaviors that affect risk of falls    -  [place] fall precautions as indicated by assessment   - Educate patient/family on patient safety including physical limitations  - Instruct patient to call for assistance with activity based on assessment  - Modify environment to reduce risk of injury  - Consider OT/PT consult to assist with strengthening/mobility  Outcome: Progressing     Problem: PAIN - ADULT  Goal: Verbalizes/displays adequate comfort level or baseline comfort level  Description  Interventions:  - Encourage patient to monitor pain and request assistance  - Assess pain using appropriate pain scale  - Administer analgesics based on type and severity of pain and evaluate response  - Implement non-pharmacological measures as appropriate and evaluate response  - Consider cultural and social influences on pain and pain management  - Notify physician/advanced practitioner if interventions unsuccessful or patient reports new pain  Outcome: Progressing     Problem: INFECTION - ADULT  Goal: Absence or prevention of progression during hospitalization  Description  INTERVENTIONS:  - Assess and monitor for signs and symptoms of infection  - Monitor lab/diagnostic results  - Monitor all insertion sites, i e  indwelling lines, tubes, and drains  - Monitor endotracheal if appropriate and nasal secretions for changes in amount and color  - [place] appropriate cooling/warming therapies per order  - Administer medications as ordered  - Instruct and encourage patient and family to use good hand hygiene technique  - Identify and instruct in appropriate isolation precautions for identified infection/condition  Outcome: Progressing     Problem: SAFETY ADULT  Goal: Maintain or return to baseline ADL function  Description  INTERVENTIONS:  -  Assess patient's ability to carry out ADLs; assess patient's baseline for ADL function and identify physical deficits which impact ability to perform ADLs (bathing, care of mouth/teeth, toileting, grooming, dressing, etc )  - Assess/evaluate cause of self-care deficits   - Assess range of motion  - Assess patient's mobility; develop plan if impaired  - Assess patient's need for assistive devices and provide as appropriate  - Encourage maximum independence but intervene and supervise when necessary  - Involve family in performance of ADLs  - Assess for home care needs following discharge   - Consider OT consult to assist with ADL evaluation and planning for discharge  - Provide patient education as appropriate  Outcome: Progressing  Goal: Maintain or return mobility status to optimal level  Description  INTERVENTIONS:  - Assess patient's baseline mobility status (ambulation, transfers, stairs, etc )    - Identify cognitive and physical deficits and behaviors that affect mobility  - Identify mobility aids required to assist with transfers and/or ambulation (gait belt, sit-to-stand, lift, walker, cane, etc )  - [place] fall precautions as indicated by assessment  - Record patient progress and toleration of activity level on Mobility SBAR; progress patient to next Phase/Stage  - Instruct patient to call for assistance with activity based on assessment  - Consider rehabilitation consult to assist with strengthening/weightbearing, etc   Outcome: Progressing     Problem: DISCHARGE PLANNING  Goal: Discharge to home or other facility with appropriate resources  Description  INTERVENTIONS:  - Identify barriers to discharge w/patient and caregiver  - Arrange for needed discharge resources and transportation as appropriate  - Identify discharge learning needs (meds, wound care, etc )  - Arrange for interpretive services to assist at discharge as needed  - Refer to Case Management Department for coordinating discharge planning if the patient needs post-hospital services based on physician/advanced practitioner order or complex needs related to functional status, cognitive ability, or social support system  Outcome: Progressing     Problem: Knowledge Deficit  Goal: Patient/family/caregiver demonstrates understanding of disease process, treatment plan, medications, and discharge instructions  Description  Complete learning assessment and assess knowledge base    Interventions:  - Provide teaching at level of understanding  - Provide teaching via preferred learning methods  Outcome: Progressing

## 2020-02-14 PROBLEM — R07.9 CHEST PAIN: Status: ACTIVE | Noted: 2020-02-14

## 2020-02-14 LAB
ANION GAP SERPL CALCULATED.3IONS-SCNC: 10 MMOL/L (ref 4–13)
ANION GAP SERPL CALCULATED.3IONS-SCNC: 11 MMOL/L (ref 4–13)
ANION GAP SERPL CALCULATED.3IONS-SCNC: 8 MMOL/L (ref 4–13)
BUN SERPL-MCNC: 7 MG/DL (ref 5–25)
BUN SERPL-MCNC: 8 MG/DL (ref 5–25)
BUN SERPL-MCNC: 8 MG/DL (ref 5–25)
CALCIUM SERPL-MCNC: 8.3 MG/DL (ref 8.3–10.1)
CALCIUM SERPL-MCNC: 8.8 MG/DL (ref 8.3–10.1)
CALCIUM SERPL-MCNC: 8.8 MG/DL (ref 8.3–10.1)
CHLORIDE SERPL-SCNC: 91 MMOL/L (ref 100–108)
CHLORIDE SERPL-SCNC: 92 MMOL/L (ref 100–108)
CHLORIDE SERPL-SCNC: 96 MMOL/L (ref 100–108)
CO2 SERPL-SCNC: 22 MMOL/L (ref 21–32)
CO2 SERPL-SCNC: 24 MMOL/L (ref 21–32)
CO2 SERPL-SCNC: 27 MMOL/L (ref 21–32)
CORTIS SERPL-MCNC: 1.3 UG/DL
CREAT SERPL-MCNC: 0.65 MG/DL (ref 0.6–1.3)
CREAT SERPL-MCNC: 0.74 MG/DL (ref 0.6–1.3)
CREAT SERPL-MCNC: 0.83 MG/DL (ref 0.6–1.3)
GFR SERPL CREATININE-BSD FRML MDRD: 72 ML/MIN/1.73SQ M
GFR SERPL CREATININE-BSD FRML MDRD: 83 ML/MIN/1.73SQ M
GFR SERPL CREATININE-BSD FRML MDRD: 91 ML/MIN/1.73SQ M
GLUCOSE SERPL-MCNC: 146 MG/DL (ref 65–140)
GLUCOSE SERPL-MCNC: 147 MG/DL (ref 65–140)
GLUCOSE SERPL-MCNC: 161 MG/DL (ref 65–140)
GLUCOSE SERPL-MCNC: 163 MG/DL (ref 65–140)
GLUCOSE SERPL-MCNC: 179 MG/DL (ref 65–140)
GLUCOSE SERPL-MCNC: 181 MG/DL (ref 65–140)
GLUCOSE SERPL-MCNC: 185 MG/DL (ref 65–140)
MAGNESIUM SERPL-MCNC: 1.9 MG/DL (ref 1.6–2.6)
OSMOLALITY UR: 192 MMOL/KG
POTASSIUM SERPL-SCNC: 3.6 MMOL/L (ref 3.5–5.3)
POTASSIUM SERPL-SCNC: 3.6 MMOL/L (ref 3.5–5.3)
POTASSIUM SERPL-SCNC: 3.9 MMOL/L (ref 3.5–5.3)
SODIUM SERPL-SCNC: 126 MMOL/L (ref 136–145)
SODIUM SERPL-SCNC: 126 MMOL/L (ref 136–145)
SODIUM SERPL-SCNC: 129 MMOL/L (ref 136–145)
TROPONIN I SERPL-MCNC: <0.02 NG/ML

## 2020-02-14 PROCEDURE — 80048 BASIC METABOLIC PNL TOTAL CA: CPT | Performed by: INTERNAL MEDICINE

## 2020-02-14 PROCEDURE — 99232 SBSQ HOSP IP/OBS MODERATE 35: CPT | Performed by: INTERNAL MEDICINE

## 2020-02-14 PROCEDURE — 82948 REAGENT STRIP/BLOOD GLUCOSE: CPT

## 2020-02-14 PROCEDURE — 97162 PT EVAL MOD COMPLEX 30 MIN: CPT

## 2020-02-14 PROCEDURE — 84484 ASSAY OF TROPONIN QUANT: CPT | Performed by: INTERNAL MEDICINE

## 2020-02-14 PROCEDURE — 97166 OT EVAL MOD COMPLEX 45 MIN: CPT

## 2020-02-14 PROCEDURE — 82533 TOTAL CORTISOL: CPT | Performed by: SURGERY

## 2020-02-14 PROCEDURE — 83735 ASSAY OF MAGNESIUM: CPT | Performed by: SURGERY

## 2020-02-14 PROCEDURE — 93005 ELECTROCARDIOGRAM TRACING: CPT

## 2020-02-14 RX ORDER — SODIUM CHLORIDE 1000 MG
3 TABLET, SOLUBLE MISCELLANEOUS
Status: DISCONTINUED | OUTPATIENT
Start: 2020-02-14 | End: 2020-02-15

## 2020-02-14 RX ORDER — POLYETHYLENE GLYCOL 3350 17 G/17G
17 POWDER, FOR SOLUTION ORAL DAILY PRN
Status: DISCONTINUED | OUTPATIENT
Start: 2020-02-14 | End: 2020-02-16 | Stop reason: HOSPADM

## 2020-02-14 RX ORDER — SODIUM CHLORIDE 1000 MG
2 TABLET, SOLUBLE MISCELLANEOUS
Status: DISCONTINUED | OUTPATIENT
Start: 2020-02-14 | End: 2020-02-14

## 2020-02-14 RX ADMIN — CEFAZOLIN SODIUM 2000 MG: 2 SOLUTION INTRAVENOUS at 10:44

## 2020-02-14 RX ADMIN — ACETAMINOPHEN 650 MG: 325 TABLET, FILM COATED ORAL at 11:13

## 2020-02-14 RX ADMIN — ACETAMINOPHEN 650 MG: 325 TABLET, FILM COATED ORAL at 02:21

## 2020-02-14 RX ADMIN — METRONIDAZOLE 500 MG: 500 INJECTION, SOLUTION INTRAVENOUS at 12:04

## 2020-02-14 RX ADMIN — Medication 2 G: at 11:13

## 2020-02-14 RX ADMIN — OXYCODONE HYDROCHLORIDE 2.5 MG: 5 TABLET ORAL at 19:48

## 2020-02-14 RX ADMIN — Medication 3 G: at 17:25

## 2020-02-14 RX ADMIN — METOPROLOL SUCCINATE 25 MG: 25 TABLET, EXTENDED RELEASE ORAL at 08:45

## 2020-02-14 RX ADMIN — METRONIDAZOLE 500 MG: 500 INJECTION, SOLUTION INTRAVENOUS at 01:45

## 2020-02-14 RX ADMIN — CEFAZOLIN SODIUM 2000 MG: 2 SOLUTION INTRAVENOUS at 02:21

## 2020-02-14 RX ADMIN — HEPARIN SODIUM 5000 UNITS: 5000 INJECTION INTRAVENOUS; SUBCUTANEOUS at 20:53

## 2020-02-14 RX ADMIN — METRONIDAZOLE 500 MG: 500 INJECTION, SOLUTION INTRAVENOUS at 18:55

## 2020-02-14 RX ADMIN — Medication 3 G: at 21:01

## 2020-02-14 RX ADMIN — OXYCODONE HYDROCHLORIDE 5 MG: 5 TABLET ORAL at 03:14

## 2020-02-14 RX ADMIN — CEFAZOLIN SODIUM 2000 MG: 2 SOLUTION INTRAVENOUS at 17:25

## 2020-02-14 RX ADMIN — INSULIN LISPRO 1 UNITS: 100 INJECTION, SOLUTION INTRAVENOUS; SUBCUTANEOUS at 17:26

## 2020-02-14 RX ADMIN — HEPARIN SODIUM 5000 UNITS: 5000 INJECTION INTRAVENOUS; SUBCUTANEOUS at 15:00

## 2020-02-14 RX ADMIN — HEPARIN SODIUM 5000 UNITS: 5000 INJECTION INTRAVENOUS; SUBCUTANEOUS at 05:40

## 2020-02-14 RX ADMIN — PRAVASTATIN SODIUM 80 MG: 80 TABLET ORAL at 17:24

## 2020-02-14 NOTE — PROGRESS NOTES
Progress Note -Surgery SURESH Mathews Lnu 71 y o  female MRN: [id number]  Unit/Bed#: S -01 Encounter: [id number]    ASSESSMENT/PLAN:  Problem List     * (Principal) Appendicitis    Hyponatremia    Hypertension    Di[facility] ([facility] )    Lab Results   Component Value Date    HGBA1C 6 3 (H) 02/13/2020     Recent Labs     02/13/20  1616 02/13/20  2107 02/14/20  0642 02/14/20  1030   POCGLU 147* 222* 147* 185*     Blood Sugar Average: Last 72 hrs:  (P) 149      Hyperlipidemia   72-year-old female admitted to [facility] for hyponatremia and postop day 1 status post laparoscopic appendectomy  Is being evaluated for chest pain and had a headache earlier  · Diet as tolerated  · Pian control PRN  · Care per medical team  · Luther Ewing for discharge from sx standpoint  DC instruction placed     VTE Pharmacologic Prophylaxis: Sequential compression device (Venodyne)  and Heparin    Subjective/Objective     Subjective: Incisional abdominal pain  No N/V    Objective/Physical Exam: Blood pressure 122/62, pulse 60, temperature 98 7 °F (37 1 °C), temperature source Oral, resp  rate 19, height 5' (1 524 m), weight 62 1 kg (137 lb), SpO2 97 %  ,Body mass index is 26 76 kg/m²      General appearance: alert and oriented, in no acute distress   Abdomen: flat and soft, incisions intact, incisional pain       Current Facility-Administered Medications:     acetaminophen (TYLENOL) tablet 650 mg, 650 mg, Oral, Q6H PRN, Jessica Maddox MD, 650 mg at 02/14/20 1113    ceFAZolin (ANCEF) IVPB (premix) 2,000 mg, 2,000 mg, Intravenous, Q8H, Jessica Maddox MD, Last Rate: 100 mL/hr at 02/14/20 1044, 2,000 mg at 02/14/20 1044    heparin (porcine) subcutaneous injection 5,000 Units, 5,000 Units, Subcutaneous, Q8H [address], 5,000 Units at 02/14/20 0540 **AND** [COMPLETED] Platelet count, , , Once, Kev Hickman PA-C    HYDROmorphone (DILAUDID) injection 0 2 mg, 0 2 mg, Intravenous, Q4H PRN, Jessica Maddox MD    influenza vaccine, high-dose Yuan Burgos HIGH-DOSE) IM injection NUBIA 0 5 mL, 0 5 mL, Intramuscular, Once, Eric Koyanagi, MD, Stopped at 02/13/20 0315    insulin lispro (HumaLOG) 100 units/mL subcutaneous injection 1-5 Units, 1-5 Units, Subcutaneous, TID With Meals **AND** Fingerstick Glucose (POCT), , , 4x Daily AC and at bedtime, Crissy Fountain PA-C    insulin lispro (HumaLOG) 100 units/mL subcutaneous injection 1-5 Units, 1-5 Units, Subcutaneous, HS, Consuelo Sutherland PA-C, 1 Units at 02/13/20 2129    losartan (COZAAR) tablet 50 mg, 50 mg, Oral, Daily, Eric Koyanagi, MD    metoprolol succinate (TOPROL-XL) 24 hr tablet 25 mg, 25 mg, Oral, Daily, Eric Koyanagi, MD, 25 mg at 02/14/20 0845    metroNIDAZOLE (FLAGYL) IVPB (premix) 500 mg, 500 mg, Intravenous, Q8H, Eric Koyanagi, MD, Last Rate: 200 mL/hr at 02/14/20 1204, 500 mg at 02/14/20 1204    oxyCODONE (ROXICODONE) IR tablet 2 5 mg, 2 5 mg, Oral, Q4H PRN, Eric Koyanagi, MD, 2 5 mg at 02/13/20 2133    oxyCODONE (ROXICODONE) IR tablet 5 mg, 5 mg, Oral, Q4H PRN, Eric Koyanagi, MD, 5 mg at 02/14/20 0314    pravastatin (PRAVACHOL) tablet 80 mg, 80 mg, Oral, Daily With Marc Sousa MD, 80 mg at 02/13/20 1630    sodium chloride tablet 2 g, 2 g, Oral, TID With Meals, Keara Herman MD, 2 g at 02/14/20 1113      Lab, Imaging and other studies:   Sodium 02/14/2020 129* 136 - 145 mmol/L Final    Potassium 02/14/2020 3 9  3 5 - 5 3 mmol/L Final    Chloride 02/14/2020 96* 100 - 108 mmol/L Final    CO2 02/14/2020 22  21 - 32 mmol/L Final    ANION GAP 02/14/2020 11  4 - 13 mmol/L Final    BUN 02/14/2020 7  5 - 25 mg/dL Final    Creatinine 02/14/2020 0 65  0 60 - 1 30 mg/dL Final    Glucose 02/14/2020 163* 65 - 140 mg/dL Final    Calcium 02/14/2020 8 8  8 3 - 10 1 mg/dL Final    eGFR 02/14/2020 91  ml/min/1 73sq m Final    Magnesium 02/14/2020 1 9  1 6 - 2 6 mg/dL Final    POC Glucose 02/14/2020 147* 65 - 140 mg/dl Final    POC Glucose 02/14/2020 185* 65 - 140 mg/dl Final

## 2020-02-14 NOTE — OCCUPATIONAL THERAPY NOTE
Occupational Therapy Evaluation     Patient Name: Raymundo Marmolejo  DLVTA'N Date: 2/14/2020  Problem List  Principal Problem:    Appendicitis  Active Problems:    Hyponatremia    Hypertension    Diabetes mellitus, type 2 ([facility] )    Hyperlipidemia    Past Medical History  Past Medical History:   Diagnosis Date    Diabetes mellitus ([facility] )     Hyperlipidemia     Hypertension      Past Surgical History  Past Surgical History:   Procedure Laterality Date    HYSTERECTOMY      DE LAP,APPENDECTOMY N/A 2/13/2020    Procedure: APPENDECTOMY LAPAROSCOPIC;  Surgeon: Andrés Olsen MD;  Location: [facility];  Service: General        02/14/20 1113   Note Type   Note type Eval only   Restrictions/Precautions   Weight Bearing Precautions Per Order No   Other Precautions Multiple lines;Pain  (IV)   Pain Assessment   Pain Assessment 0-10   Pain Score 3  (headache w/ household distances functional mobility)   Pain Type Acute pain;Surgical pain  (w/ activity and coughing)   Pain Location Abdomen   Pain Orientation Mid;Lower   Effect of Pain on Daily Activities limits activity tolerance and activity tolerance during ADL performance   Patient's Stated Pain Goal No pain   Hospital Pain Intervention(s) Repositioned; Ambulation/increased activity; Emotional support  (RNInez aware and in to provide meds post eval)   Response to Interventions tolerated, returned to bed post eval due to fatigue and headache   Home Living   Type of [facility] Two level;Elevator; Other (Comment)  (0 JEYSON)   Bathroom Shower/Tub Tub/shower unit  (and walk in shower)   [facility]  chair; Other (Comment)  (shower stool w/ out back)   [address] Other (Comment)  (no AD)   Additional Comments Pt primarily Victorino speaking and agreeable to son in law present assisting w/ translation  Pt currently staying w/ son in law and daughter in [address] [place] [address] w/ 0 JEYSON and elevator   Pt has a return ticket home in , and is here to visist and assist w/  grandchild   Prior Function   Level of [place] Independent with ADLs and functional mobility   Lives With Daughter; Other (Comment)  (son in law and  grandchild)   [address] Help From Family   ADL Assistance Independent   IADLs Independent   Falls in the last 6 months 1 to 4  (1 while out walking in community)   Comments Pt completing ADL indendently PTA w/ out use of AD or DME  Lifestyle   Autonomy Pt completed ADL Sandra w/ out use of AD or DME PTA   Reciprocal Relationships Supportive son in law present and assisted w/ translsation   Intrinsic Gratification Pt enjoys  grandson and reading  Limited reading here due to language barrier  Pt reported frustration that she is here to help w/ baby and now she needs help   ADL   Where Assessed Chair   Eating Assistance 6  Modified independent   Eating Deficit Setup   Grooming Assistance 6  Modified Independent   Grooming Deficit Setup   UB Bathing Assistance Unable to assess   LB Bathing Assistance Unable to assess   UB Dressing Assistance 6  Modified independent  (due to manage IV in L UE)   UB Dressing Deficit Setup;Verbal cueing; Increased time to complete   Additional Comments Pt's son in law reported pt has been walking to the bathroom   Bed Mobility   Supine to Sit Unable to assess   Sit to Supine 5  Supervision   Additional items Assist x 1; Increased time required;Verbal cues   Additional Comments Pt seated OOB in chair upon arrival and supine HOB elevated post eval w/ needs met, call bell in reach and son in law present   Transfers   Sit to Stand 6  Modified independent   Additional items Assist x 1; Increased time required   Stand to Sit 6  Modified independent   Additional items Assist x 1; Increased time required   Functional Mobility   Functional Mobility 5  Supervision   Additional Comments + time household distances   Pt reports fatigue / weakness and requested to return to bed post eval   Additional items   (no AD)   Balance   Static Sitting Good   Static Standing Good   Ambulatory Fair   Activity Tolerance   Activity Tolerance Patient limited by fatigue;Patient limited by pain   Medical Staff Made Aware spoke to PTStefanie and Bobby MADRIGAL   Nurse Made Aware spoke to Liseth NUNES   RUE Assessment   RUE Assessment WFL   RUE Strength   RUE Overall Strength Within Functional Limits - able to perform ADL tasks with strength  (observed during functional tasks)   LUE Assessment   LUE Assessment WFL   LUE Strength   LUE Overall Strength Within Functional Limits - able to perform ADL tasks with strength  (observed during functional tasks)   Hand Function   Gross Motor Coordination Functional   Fine Motor Coordination Functional   Sensation   Light Touch Not tested   Sharp/Dull Not tested   Cognition   Overall Cognitive Status Unable to assess  (Hinid speaking and son present translated)   Arousal/Participation Alert; Cooperative   Attention Attends with cues to redirect   Orientation Level Other (Comment)  (able to communicate appropriately w/ son)   Memory Other (Comment)  (appeared WFL during coomunication w/ son in law)   Following Commands Follows multistep commands with increased time or repetition   Comments Identified pt by full name and wristband  Pt able to follow directions w/ son in law present translating   Assessment   Assessment Pt is a 71yo female [facility]13[facility]  Pt presents w/ appendicitis and significant PMH impacting her occupational performance including HTN, DM  Pt presents s/p laparoscopic appendectomy on 2/13/2020  Pt is currently staying w/ son in law and daughter in [address] [facility] w/ 0 JEYSON and elevator  Pt completed ADL Sandra w/ out use of AD or DME  Upon eval, pt cooperative and able to follow directions w/ son in law translating  Pt completed bed mobility w/ S sit to supine  Pt required mod I for set- up to complete sit to stand, grooming and dressing   Pt engaged in functional mobiity household distances w/ S  Pt completing ADL w/ + time but w/ out physical assistance  From an OT perspective, pt can return to PLOF w/family assist  Recommend active participation in ADL w/ nursing staff while in acute care  No additional OT needs at this time   D/C OT   Goals   Patient Goals Pt stated that she would like to have less pain and return home to PLOF w/ son in law and daughter and prepare meals to help out w/  grandchild   Recommendation   OT Discharge Recommendation Home with family support   Equipment Recommended Tub seat with back   OT - OK to Discharge   (when medically stable)   Barthel Index   Feeding 10   Bathing 0   Grooming Score 5   Dressing Score 10   Bladder Score 10   Bowels Score 10   Toilet Use Score 10   Transfers (Bed/Chair) Score 15   Mobility (Level Surface) Score 10   Stairs Score 5   Barthel Index Score 85   Modified [place] Scale   Modified [place] Scale 2   Linda Layton, OTR/L

## 2020-02-14 NOTE — ASSESSMENT & PLAN NOTE
Now with on and off chest pain since early this morning  Given her risk factors and surgery yesterday will place her on telemetry and trend troponins

## 2020-02-14 NOTE — DISCHARGE INSTRUCTIONS
Ok to shower  No tubs, hot tubs  Rendering Text In Billing: The biopsy specimen was grossed and processed into a slide.

## 2020-02-14 NOTE — ASSESSMENT & PLAN NOTE
Status post appendectomy  Discuss with surgery stable for discharge from their point of view  Continue with IV antibiotics

## 2020-02-14 NOTE — PROGRESS NOTES
Progress Note - Nephrology   [facility] Lnu 71 y o  female MRN: [id number]  Unit/Bed#: S -01 Encounter: [id number]    ASSESSMENT AND PLAN:  70-year-old female with history hypertension/diabetes mellitus type 2/dyslipidemia who presented with 1 day history right upper quadrant and right lower quadrant abdominal pain some nausea  Found to have appendicitis status post appendectomy yesterday  We are asked to see her for hyponatremia:    1  Hyponatremia:  · Differential diagnosis would include use of indepamide(a  thiazide like diuretic), prerenal/pain leading to increase in ADH release/rule out adrenal insufficiency  Certainly other causes of SIADH or possible including Protonix  Workup:  · Urine sodium:  80 although this was on a diuretic  · Urine osmolality:  192  · Serum osmolality:  275 compatible with true hypo osmotic hyponatremia  · Uric acid:  3 6 borderline low possibly compatible with SIADH  · TSH:  Normal at 0 846  · A m  Cortisol:  7 3  · CT of the head:  No acute abnormality  · CT of the chest abdomen pelvis:  Possible appendicitis otherwise no other abnormality  · CURRENT SODIUM:  DECREASE DOWN   CORRECTING FOR GLUCOSE APPROXIMATELY 131  Treatment:  · Fluid restriction 1200 mL per day  · I would add sodium chloride tablets and Hep-Lock D5W which was being given to avoid over correction  · AVOID INDEPAMIDE ANOTHER THIAZIDE DIURETICS PERMANENTLY  · Hold Protonix use Pepcid if needed  · Monitor sodium closely     2  Hypertension:  Low normal   Place hold parameters  Stopped the indepamide      3  Hypokalemia:  Repleted  magnesium acceptable 1 9  Subjective: The patient is feeling better status post appendectomy  Mild abdominal pain but no nausea vomiting  No shortness of breath  Objective:     Vitals: Blood pressure 122/62, pulse 60, temperature 98 7 °F (37 1 °C), temperature source Oral, resp  rate 19, height 5' (1 524 m), weight 62 1 kg (137 lb), SpO2 97 %  ,Body mass index is 26 76 kg/m²      Weight (last 2 days)     Date/Time   Weight    02/13/20 1338   62 1 (137)    02/13/20 1147   62 2 (137 13)    02/13/20 0351   62 2 (137 13)    02/13/20 0000   62 5 (137 79)                Intake/Output Summary (Last 24 hours) at 2/14/2020 2562  Last data filed at 2/14/2020 0548  Gross per 24 hour   Intake 800 ml   Output 1700 ml   Net -900 ml            Physical Exam: General:  No acute distress, sitting out of bed with only mild discomfort  Skin:  No acute rash  Eyes:  No scleral icterus and noninjected  ENT:  Moist mucous membranes  Neck:  Supple, no jugular venous distention, trachea midline, overall appearance is normal  Chest:  Clear to auscultation  CVS:  Regular rate and rhythm, without a rub or gallops  Abdomen:  Normal bowel sounds, soft and mild tenderness on palpation, and nondistended  Extremities:  No edema, and no cyanosis, no significant arthritic changes  Neuro:  No gross focality  Psych:  Alert and oriented and appropriate                Medications:    Scheduled Meds:  Current Facility-Administered Medications:  acetaminophen 650 mg Oral Q6H PRN Emerson Orozco MD    cefazolin 2,000 mg Intravenous Q8H Emerson Orozco MD Last Rate: 2,000 mg (02/14/20 0221)   dextrose 50 mL/hr Intravenous Continuous Lawrence Staples, DO Last Rate: 50 mL/hr (02/13/20 2127)   heparin (porcine) 5,000 Units Subcutaneous [facility] Jayda Washington PA-C    HYDROmorphone 0 2 mg Intravenous Q4H PRN Emerson Orozco MD    influenza vaccine 0 5 mL Intramuscular Once Emerson Orozco MD    insulin lispro 1-5 Units Subcutaneous TID With Meals Lili Buckner PA-C    insulin lispro 1-5 Units Subcutaneous HS Consuelo Sutherland PA-C    losartan 50 mg Oral Daily Emerson Orozco MD    metoprolol succinate 25 mg Oral Daily Emerson Orozco MD    metroNIDAZOLE 500 mg Intravenous Q8H Emerson Orozco MD Last Rate: 500 mg (02/14/20 0145)   oxyCODONE 2 5 mg Oral Q4H PRN Emerson Orozco MD    oxyCODONE 5 mg Oral Q4H PRN Emerson Orozco MD    pravastatin 80 mg Oral Daily With Peterson Davenport MD        PRN Meds:   acetaminophen    HYDROmorphone    oxyCODONE    oxyCODONE    Continuous Infusions:  dextrose 50 mL/hr Last Rate: 50 mL/hr (02/13/20 2127)       Lab, Imaging and other studies: I have personally reviewed pertinent labs  Laboratory Results:  Results from last 7 days   Lab Units 02/14/20  0541 02/13/20  1953 02/13/20  1544 02/13/20  1002 02/13/20  0441 02/12/20  2300   WBC Thousand/uL  --   --   --   --  7 17 9 39   HEMOGLOBIN g/dL  --   --   --   --  11 2* 12 6   HEMATOCRIT %  --   --   --   --  33 5* 36 9   PLATELETS Thousands/uL  --  277  --   --  240 292   POTASSIUM mmol/L 3 9 3 9 3 8 3 9 3 7 3 3*   CHLORIDE mmol/L 96* 98* 101 98* 96* 89*   CO2 mmol/L 22 22 26 27 24 28   BUN mg/dL 7 7 5 5 6 9   CREATININE mg/dL 0 65 0 89 0 68 0 56* 0 51* 0 58*   CALCIUM mg/dL 8 8 8 5 8 9 8 7 8 1* 9 1   MAGNESIUM mg/dL 1 9  --   --   --   --   --      Urinalysis: Lab Results   Component Value Date    COLORU Light Yellow 02/13/2020    CLARITYU Clear 02/13/2020    SPECGRAV 1 010 02/13/2020    PHUR 7 5 02/13/2020    LEUKOCYTESUR Negative 02/13/2020    NITRITE Negative 02/13/2020    GLUCOSEU Negative 02/13/2020    KETONESU Negative 02/13/2020    BILIRUBINUR Negative 02/13/2020    BLOODU Trace-Intact (A) 02/13/2020     ABGs: No results found for: [place]  Radiology review:     Portions of the record may have been created with voice recognition software  Occasional wrong word or "sound a like" substitutions may have occurred due to the inherent limitations of voice recognition software  Read the chart carefully and recognize, using context, where substitutions have occurred

## 2020-02-14 NOTE — PROGRESS NOTES
Progress Note - Reid Lnu 1950, 71 y o  female MRN: [id number]    Unit/Bed#: S -01 Encounter: [id number]    Primary Care Provider: No primary care provider on file  Date and time admitted to hospital: 2020 10:54 PM        Chest pain  Assessment & Plan  Now with on and off chest pain since early this morning  Given her risk factors and surgery yesterday will place her on telemetry and trend troponins  Hyponatremia  Assessment & Plan  Sodium remains low  Nephrology are following  Not stable for discharge    * Appendicitis  Assessment & Plan  Status post appendectomy  Discuss with surgery stable for discharge from their point of view  Continue with IV antibiotics  VTE Pharmacologic Prophylaxis:   Pharmacologic: Heparin  Mechanical VTE Prophylaxis in Place: Yes    Patient Centered Rounds: I have performed bedside rounds with nursing staff today  Discussions with Specialists or Other Care Team Provider:   Discussed with surgery  Education and Discussions with Family / Patient: Discussed with patient and with son in law  Time Spent for Care: 30 minutes  More than 50% of total time spent on counseling and coordination of care as described above  Current Length of Stay: 1 day(s)    Current Patient Status: Inpatient   Certification Statement: The patient will continue to require additional inpatient hospital stay due to chest pain and hyponatremia  Discharge Plan:  Likely 48 hours    Code Status: Level 1 - Full Code      Subjective:   Patient seen and examined  Says that her abdominal pain is better however now complaining of left-sided chest pain  Objective:     Vitals:   Temp (24hrs), Av 7 °F (36 5 °C), Min:97 °F (36 1 °C), Max:98 7 °F (37 1 °C)    Temp:  [97 °F (36 1 °C)-98 7 °F (37 1 °C)] 98 7 °F (37 1 °C)  HR:  [60-82] 60  Resp:  [17-22] 19  BP: (110-153)/(52-70) 122/62  SpO2:  [97 %-100 %] 97 %  Body mass index is 26 76 kg/m²       Input and Output Summary (last 24 hours): Intake/Output Summary (Last 24 hours) at 2/14/2020 1434  Last data filed at 2/14/2020 1337  Gross per 24 hour   Intake 1390 ml   Output 1300 ml   Net 90 ml       Physical Exam:     Physical Exam   Constitutional: She appears well-developed  No distress  HENT:   Head: Normocephalic  Mouth/Throat: No oropharyngeal exudate  Eyes: Pupils are equal, round, and reactive to light  Right eye exhibits no discharge  Left eye exhibits no discharge  No scleral icterus  Neck: No JVD present  No tracheal deviation present  No thyromegaly present  Cardiovascular: Normal rate  Exam reveals no gallop and no friction rub  No murmur heard  Pulmonary/Chest: Effort normal  No stridor  No respiratory distress  She has no wheezes  She has no rales  She exhibits no tenderness  Abdominal: Soft  She exhibits no distension and no mass  There is no tenderness  There is no rebound and no guarding  No hernia  Musculoskeletal: She exhibits no edema, tenderness or deformity  Lymphadenopathy:     She has no cervical adenopathy  Neurological: She is alert  She displays normal reflexes  No sensory deficit  She exhibits normal muscle tone  Coordination normal    Skin: Capillary refill takes less than 2 seconds  No rash noted  She is not diaphoretic  No erythema  There is pallor  Psychiatric: She has a normal mood and affect         Additional Data:     Labs:    Results from last 7 days   Lab Units 02/13/20  1953 02/13/20  0441   WBC Thousand/uL  --  7 17   HEMOGLOBIN g/dL  --  11 2*   HEMATOCRIT %  --  33 5*   PLATELETS Thousands/uL 277 240   NEUTROS PCT %  --  78*   LYMPHS PCT %  --  14   MONOS PCT %  --  5   EOS PCT %  --  2     Results from last 7 days   Lab Units 02/14/20  1341  02/12/20  2300   SODIUM mmol/L 126*   < > 126*   POTASSIUM mmol/L 3 6   < > 3 3*   CHLORIDE mmol/L 91*   < > 89*   CO2 mmol/L 27   < > 28   BUN mg/dL 8   < > 9   CREATININE mg/dL 0 83   < > 0 58*   ANION GAP mmol/L 8   < > 9   CALCIUM mg/dL 8 8   < > 9 1   ALBUMIN g/dL  --   --  4 0   TOTAL BILIRUBIN mg/dL  --   --  0 60   ALK PHOS U/L  --   --  82   ALT U/L  --   --  33   AST U/L  --   --  17   GLUCOSE RANDOM mg/dL 161*   < > 165*    < > = values in this interval not displayed  Results from last 7 days   Lab Units 02/12/20  2300   INR  1 08     Results from last 7 days   Lab Units 02/14/20  1030 02/14/20  0642 02/13/20  2107 02/13/20  1616 02/13/20  1343 02/13/20  1147 02/13/20  0657 02/13/20  0501 02/12/20  2331   POC GLUCOSE mg/dl 185* 147* 222* 147* 104 124 113 127 172*     Results from last 7 days   Lab Units 02/13/20  0441   HEMOGLOBIN A1C % 6 3*     Results from last 7 days   Lab Units 02/12/20  2300   LACTIC ACID mmol/L 0 9           * I Have Reviewed All Lab Data Listed Above  * Additional Pertinent Lab Tests Reviewed: All [facility] Admission Reviewed    Imaging:    Imaging Reports Reviewed Today Include:   "  No acute intracranial hemorrhage, midline shift, or mass effect "  Imaging Personally Reviewed by Myself Includes:    As above    Recent Cultures (last 7 days):     Results from last 7 days   Lab Units 02/12/20  2330 02/12/20  2300   BLOOD CULTURE  No Growth at 24 hrs  No Growth at 24 hrs         Last 24 Hours Medication List:     Current Facility-Administered Medications:  acetaminophen 650 mg Oral Q6H PRN Mc Gleason MD    cefazolin 2,000 mg Intravenous Q8H Mc Gleason MD Last Rate: 2,000 mg (02/14/20 1044)   heparin (porcine) 5,000 Units Subcutaneous [facility] Lucho Merritt PA-C    HYDROmorphone 0 2 mg Intravenous Q4H PRN Mc Gleason MD    influenza vaccine 0 5 mL Intramuscular Once Mc Gleason MD    insulin lispro 1-5 Units Subcutaneous TID With Meals Tarun Valdez PA-C    insulin lispro 1-5 Units Subcutaneous HS Consuelo Sutherland PA-C    losartan 50 mg Oral Daily Mc Gleason MD    metoprolol succinate 25 mg Oral Daily Mc Gleason MD    metroNIDAZOLE 500 mg Intravenous Q8H Brett MD Thu Last Rate: 500 mg (02/14/20 1204)   oxyCODONE 2 5 mg Oral Q4H PRN Jalyn Mansfield MD    oxyCODONE 5 mg Oral Q4H PRN Jalyn Mansfield MD    pravastatin 80 mg Oral Daily With Cale Pinedo MD    sodium chloride 2 g Oral TID With Meals Debo Powell MD         Today, Patient Was Seen By: Em Horne MD    ** Please Note: Dictation voice to text software may have been used in the creation of this document   **

## 2020-02-15 LAB
ANION GAP SERPL CALCULATED.3IONS-SCNC: 9 MMOL/L (ref 4–13)
ATRIAL RATE: 57 BPM
ATRIAL RATE: 58 BPM
BUN SERPL-MCNC: 6 MG/DL (ref 5–25)
CALCIUM SERPL-MCNC: 8.6 MG/DL (ref 8.3–10.1)
CHLORIDE SERPL-SCNC: 99 MMOL/L (ref 100–108)
CO2 SERPL-SCNC: 24 MMOL/L (ref 21–32)
CREAT SERPL-MCNC: 0.63 MG/DL (ref 0.6–1.3)
GFR SERPL CREATININE-BSD FRML MDRD: 92 ML/MIN/1.73SQ M
GLUCOSE SERPL-MCNC: 102 MG/DL (ref 65–140)
GLUCOSE SERPL-MCNC: 104 MG/DL (ref 65–140)
GLUCOSE SERPL-MCNC: 137 MG/DL (ref 65–140)
GLUCOSE SERPL-MCNC: 165 MG/DL (ref 65–140)
GLUCOSE SERPL-MCNC: 82 MG/DL (ref 65–140)
MAGNESIUM SERPL-MCNC: 1.7 MG/DL (ref 1.6–2.6)
P AXIS: 70 DEGREES
P AXIS: 74 DEGREES
POTASSIUM SERPL-SCNC: 3.9 MMOL/L (ref 3.5–5.3)
PR INTERVAL: 230 MS
PR INTERVAL: 234 MS
QRS AXIS: 57 DEGREES
QRS AXIS: 59 DEGREES
QRSD INTERVAL: 82 MS
QRSD INTERVAL: 82 MS
QT INTERVAL: 422 MS
QT INTERVAL: 424 MS
QTC INTERVAL: 412 MS
QTC INTERVAL: 414 MS
SODIUM SERPL-SCNC: 132 MMOL/L (ref 136–145)
T WAVE AXIS: 42 DEGREES
T WAVE AXIS: 49 DEGREES
VENTRICULAR RATE: 57 BPM
VENTRICULAR RATE: 58 BPM

## 2020-02-15 PROCEDURE — 82948 REAGENT STRIP/BLOOD GLUCOSE: CPT

## 2020-02-15 PROCEDURE — 99232 SBSQ HOSP IP/OBS MODERATE 35: CPT | Performed by: INTERNAL MEDICINE

## 2020-02-15 PROCEDURE — 93005 ELECTROCARDIOGRAM TRACING: CPT

## 2020-02-15 PROCEDURE — 80048 BASIC METABOLIC PNL TOTAL CA: CPT | Performed by: INTERNAL MEDICINE

## 2020-02-15 PROCEDURE — 93010 ELECTROCARDIOGRAM REPORT: CPT | Performed by: INTERNAL MEDICINE

## 2020-02-15 PROCEDURE — 83735 ASSAY OF MAGNESIUM: CPT | Performed by: INTERNAL MEDICINE

## 2020-02-15 RX ORDER — SODIUM CHLORIDE 1000 MG
2 TABLET, SOLUBLE MISCELLANEOUS
Status: DISCONTINUED | OUTPATIENT
Start: 2020-02-15 | End: 2020-02-16

## 2020-02-15 RX ADMIN — Medication 2 G: at 17:39

## 2020-02-15 RX ADMIN — HEPARIN SODIUM 5000 UNITS: 5000 INJECTION INTRAVENOUS; SUBCUTANEOUS at 14:26

## 2020-02-15 RX ADMIN — HEPARIN SODIUM 5000 UNITS: 5000 INJECTION INTRAVENOUS; SUBCUTANEOUS at 05:42

## 2020-02-15 RX ADMIN — METRONIDAZOLE 500 MG: 500 INJECTION, SOLUTION INTRAVENOUS at 01:10

## 2020-02-15 RX ADMIN — METRONIDAZOLE 500 MG: 500 INJECTION, SOLUTION INTRAVENOUS at 10:18

## 2020-02-15 RX ADMIN — Medication 2 G: at 21:43

## 2020-02-15 RX ADMIN — CEFAZOLIN SODIUM 2000 MG: 2 SOLUTION INTRAVENOUS at 10:18

## 2020-02-15 RX ADMIN — CEFAZOLIN SODIUM 2000 MG: 2 SOLUTION INTRAVENOUS at 17:38

## 2020-02-15 RX ADMIN — METRONIDAZOLE 500 MG: 500 INJECTION, SOLUTION INTRAVENOUS at 17:39

## 2020-02-15 RX ADMIN — INSULIN LISPRO 1 UNITS: 100 INJECTION, SOLUTION INTRAVENOUS; SUBCUTANEOUS at 17:38

## 2020-02-15 RX ADMIN — Medication 3 G: at 08:35

## 2020-02-15 RX ADMIN — PRAVASTATIN SODIUM 80 MG: 80 TABLET ORAL at 17:40

## 2020-02-15 RX ADMIN — POLYETHYLENE GLYCOL 3350 17 G: 17 POWDER, FOR SOLUTION ORAL at 10:18

## 2020-02-15 RX ADMIN — HEPARIN SODIUM 5000 UNITS: 5000 INJECTION INTRAVENOUS; SUBCUTANEOUS at 21:44

## 2020-02-15 RX ADMIN — CEFAZOLIN SODIUM 2000 MG: 2 SOLUTION INTRAVENOUS at 02:24

## 2020-02-15 RX ADMIN — METOPROLOL SUCCINATE 25 MG: 25 TABLET, EXTENDED RELEASE ORAL at 08:35

## 2020-02-15 NOTE — ASSESSMENT & PLAN NOTE
Now with on and off chest pain since early this morning  Given her risk factors and surgery yesterday will place her on telemetry and trend troponins  troponins negative   No events on telemetry     Slightly bradycardic this am

## 2020-02-15 NOTE — PROGRESS NOTES
Benjamin 50 PROGRESS NOTE   [facility] Lnu 71 y o  female MRN: [id number]  Unit/Bed#: S -01 Encounter: [id number]  Reason for Consult:  Hyponatremia    ASSESSMENT and PLAN:    35-year-old female with a past medical history of hypertension, diabetes, hyperlipidemia who presents with abdominal pain  Was found to have appendicitis and status post appendectomy on 02/14     1) hyponatremia    -admission sodium 126  Admission potassium was also low  - etiology unclear but possibly related to pain/SIADH/indapamide/less likely Protonix  -urine sodium-80-was on diuretic  -urine osmolarity -192  -serum osmolarity -275  -uric acid-3 6  -TSH-0 8  -cortisol-7 3-not significantly low  -CT of the chest, abdomen, pelvis with acute appendicitis but no other significant masses noted  -continue fluid restriction  -continue salt tablets-but lower to 2 g from 3 g and likely will lower to t i d  Dosing tomorrow  -continue holding diuretics  - repeat BMP in a m   -for now can continue losartan  -reviewed with primary team attending the disp plan    2) appendicitis-per primary surgery team   Status post appendectomy    3) chest discomfort-per primary team   Evaluation in progress    - bradycardia - primary team evaluating  - to note, pt states she was told of bradycardia in [facility] prior  - to note is on beta blocker    4) acid/base-bicarbonate stable    5) electrolytes-potassium stable  Hyponatremia as above    SUBJECTIVE / INTERVAL HISTORY:    Blood pressure is 785-621 systolic  Patient denies complaints  Son is translating for the patient      OBJECTIVE:  Current Weight: Weight - Scale: 62 1 kg (137 lb)  Vitals:    02/14/20 0700 02/14/20 1500 02/14/20 2207 02/15/20 0834   BP: 122/62 107/56 155/67 128/59   BP Location: Right arm Right arm Right arm    Pulse: 60 63 77 56   Resp: 19 18 17    Temp: 98 7 °F (37 1 °C) 98 2 °F (36 8 °C) 97 8 °F (36 6 °C)    TempSrc: Oral Oral Oral    SpO2: 97% 99% 95%    Weight:       Height: Intake/Output Summary (Last 24 hours) at 2/15/2020 1222  Last data filed at 2/15/2020 0500  Gross per 24 hour   Intake 240 ml   Output 2100 ml   Net -1860 ml     General: NAD  Skin: no rash  Eyes: anicteric sclera  ENT: moist mucous membrane  Neck: supple  Chest: CTA b/l, no ronchii, no wheeze, no rubs, no rales  CVS: s1s2, no murmur, no gallop, no rub  Abdomen: soft, nontender, nl sounds, surg site well healing  Extremities: no edema LE b/l  : no banegas  Neuro: AAOX3  Psych: normal affect    Medications:    Current Facility-Administered Medications:     acetaminophen (TYLENOL) tablet 650 mg, 650 mg, Oral, Q6H PRN, Tien Solano MD, 650 mg at 02/14/20 1113    ceFAZolin (ANCEF) IVPB (premix) 2,000 mg, 2,000 mg, Intravenous, Q8H, Tien Solano MD, Last Rate: 100 mL/hr at 02/15/20 1018, 2,000 mg at 02/15/20 1018    heparin (porcine) subcutaneous injection 5,000 Units, 5,000 Units, Subcutaneous, Q8H [address], 5,000 Units at 02/15/20 0542 **AND** [COMPLETED] Platelet count, , , Once, Chery Escobedo PA-C    HYDROmorphone (DILAUDID) injection 0 2 mg, 0 2 mg, Intravenous, Q4H PRN, Tien Solano MD    influenza vaccine, high-dose (FLUZONE HIGH-DOSE) IM injection NUBIA 0 5 mL, 0 5 mL, Intramuscular, Once, Tien Solano MD, Stopped at 02/13/20 0315    insulin lispro (HumaLOG) 100 units/mL subcutaneous injection 1-5 Units, 1-5 Units, Subcutaneous, TID With Meals, 1 Units at 02/14/20 1726 **AND** Fingerstick Glucose (POCT), , , 4x Daily AC and at bedtime, Nicola Simon PA-C    insulin lispro (HumaLOG) 100 units/mL subcutaneous injection 1-5 Units, 1-5 Units, Subcutaneous, HS, Consuelo Sutherland PA-C, 1 Units at 02/13/20 2129    losartan (COZAAR) tablet 50 mg, 50 mg, Oral, Daily, Tien Solano MD    metoprolol succinate (TOPROL-XL) 24 hr tablet 25 mg, 25 mg, Oral, Daily, Tien Solano MD, 25 mg at 02/15/20 0835    metroNIDAZOLE (FLAGYL) IVPB (premix) 500 mg, 500 mg, Intravenous, Q8H, Tien Solano MD, Last Rate: 200 mL/hr at 02/15/20 1018, 500 mg at 02/15/20 1018    oxyCODONE (ROXICODONE) IR tablet 2 5 mg, 2 5 mg, Oral, Q4H PRN, Flaca Murray MD, 2 5 mg at 02/14/20 1948    oxyCODONE (ROXICODONE) IR tablet 5 mg, 5 mg, Oral, Q4H PRN, Flaca Murray MD, 5 mg at 02/14/20 0314    polyethylene glycol (MIRALAX) packet 17 g, 17 g, Oral, Daily PRN, Juan Charlton PA-C, 17 g at 02/15/20 1018    pravastatin (PRAVACHOL) tablet 80 mg, 80 mg, Oral, Daily With Dorota Shay MD, 80 mg at 02/14/20 1724    sodium chloride tablet 3 g, 3 g, Oral, 4x Daily (with meals and at bedtime), Leonor Morrow MD, 3 g at 02/15/20 0835    Laboratory Results:  Results from last 7 days   Lab Units 02/15/20  0649 02/14/20 1955 02/14/20  1341 02/14/20  0541 02/13/20  1953 02/13/20  1544 02/13/20  1002 02/13/20  0441 02/12/20  2300   WBC Thousand/uL  --   --   --   --   --   --   --  7 17 9 39   HEMOGLOBIN g/dL  --   --   --   --   --   --   --  11 2* 12 6   HEMATOCRIT %  --   --   --   --   --   --   --  33 5* 36 9   PLATELETS Thousands/uL  --   --   --   --  277  --   --  240 292   POTASSIUM mmol/L 3 9 3 6 3 6 3 9 3 9 3 8 3 9 3 7 3 3*   CHLORIDE mmol/L 99* 92* 91* 96* 98* 101 98* 96* 89*   CO2 mmol/L 24 24 27 22 22 26 27 24 28   BUN mg/dL 6 8 8 7 7 5 5 6 9   CREATININE mg/dL 0 63 0 74 0 83 0 65 0 89 0 68 0 56* 0 51* 0 58*   CALCIUM mg/dL 8 6 8 3 8 8 8 8 8 5 8 9 8 7 8 1* 9 1   MAGNESIUM mg/dL 1 7  --   --  1 9  --   --   --   --   --

## 2020-02-15 NOTE — ASSESSMENT & PLAN NOTE
Lab Results   Component Value Date    HGBA1C 6 3 (H) 02/13/2020       Recent Labs     02/14/20  2052 02/15/20  0702 02/15/20  1127 02/15/20  1541   POCGLU 146* 82 102 165*       Blood Sugar Average: Last 72 hrs:  (P) 144 [id number]   · Home regimen of Amaryl  · Sliding scale insulin and q 6 hour Accu-Cheks

## 2020-02-15 NOTE — PROGRESS NOTES
Progress Note - Reid Lnu 1950, 71 y o  female MRN: [id number]    Unit/Bed#: S -01 Encounter: [id number]    Primary Care Provider: No primary care provider on file  Date and time admitted to hospital: 2/12/2020 10:54 PM        Chest pain  Assessment & Plan  Now with on and off chest pain since early this morning  Given her risk factors and surgery yesterday will place her on telemetry and trend troponins  troponins negative   No events on telemetry  Slightly bradycardic this am          Diabetes mellitus, type 2 [facility])  Assessment & Plan  Lab Results   Component Value Date    HGBA1C 6 3 (H) 02/13/2020       Recent Labs     02/14/20  2052 02/15/20  0702 02/15/20  1127 02/15/20  1541   POCGLU 146* 82 102 165*       Blood Sugar Average: Last 72 hrs:  (P) 144 [id number]   · Home regimen of Amaryl  · Sliding scale insulin and q 6 hour Accu-Cheks  Hypertension  Assessment & Plan  Continue home medications  /52  Hyponatremia  Assessment & Plan  Now normalizing  Renal are following  Likely stable for DC from their POV  * Appendicitis  Assessment & Plan  Status post appendectomy  Discuss with surgery stable for discharge from their point of view  DC abx  Still no BM  Will give miralax this am                VTE Pharmacologic Prophylaxis:   Pharmacologic: Heparin  Mechanical VTE Prophylaxis in Place: Yes    Patient Centered Rounds: I have performed bedside rounds with nursing staff today  Discussions with Specialists or Other Care Team Provider: Discussed with surgery  Education and Discussions with Family / Patient: Discussed with patient and with son - in - law     Time Spent for Care: 30 minutes  More than 50% of total time spent on counseling and coordination of care as described above      Current Length of Stay: 2 day(s)    Current Patient Status: Inpatient   Certification Statement: The patient will continue to require additional inpatient hospital stay due to monitor bowel movements  Discharge Plan: Likely tomorrow  Code Status: Level 1 - Full Code      Subjective:   Patient seen and examined  Feeling "good"  No further CP or dizziness  No BM but passing gas  Objective:     Vitals:   Temp (24hrs), Av 8 °F (36 6 °C), Min:97 8 °F (36 6 °C), Max:97 8 °F (36 6 °C)    Temp:  [97 8 °F (36 6 °C)] 97 8 °F (36 6 °C)  HR:  [56-77] 66  Resp:  [17] 17  BP: (103-155)/(52-67) 103/52  SpO2:  [95 %-98 %] 98 %  Body mass index is 26 76 kg/m²  Input and Output Summary (last 24 hours): Intake/Output Summary (Last 24 hours) at 2/15/2020 1740  Last data filed at 2/15/2020 1225  Gross per 24 hour   Intake 600 ml   Output 2200 ml   Net -1600 ml       Physical Exam:     Physical Exam   Constitutional: She appears well-developed  No distress  HENT:   Mouth/Throat: No oropharyngeal exudate  Eyes: Right eye exhibits no discharge  Left eye exhibits no discharge  No scleral icterus  Neck: Normal range of motion  No JVD present  No tracheal deviation present  No thyromegaly present  Cardiovascular: Normal rate  Exam reveals no gallop and no friction rub  No murmur heard  Pulmonary/Chest: Effort normal  No stridor  No respiratory distress  She has no wheezes  She has no rales  She exhibits no tenderness  Abdominal: Soft  She exhibits no distension and no mass  There is no tenderness  There is no rebound and no guarding  No hernia  Musculoskeletal: She exhibits no edema, tenderness or deformity  Lymphadenopathy:     She has no cervical adenopathy  Neurological: She is alert  She displays normal reflexes  No cranial nerve deficit or sensory deficit  She exhibits normal muscle tone  Coordination normal    Skin: Capillary refill takes less than 2 seconds  No rash noted  She is not diaphoretic  No erythema  There is pallor  Psychiatric: She has a normal mood and affect           Additional Data:     Labs:    Results from last 7 days   Lab Units 02/13/20  1953 02/13/20  0441   WBC Thousand/uL  --  7 17   HEMOGLOBIN g/dL  --  11 2*   HEMATOCRIT %  --  33 5*   PLATELETS Thousands/uL 277 240   NEUTROS PCT %  --  78*   LYMPHS PCT %  --  14   MONOS PCT %  --  5   EOS PCT %  --  2     Results from last 7 days   Lab Units 02/15/20  0649  02/12/20  2300   SODIUM mmol/L 132*   < > 126*   POTASSIUM mmol/L 3 9   < > 3 3*   CHLORIDE mmol/L 99*   < > 89*   CO2 mmol/L 24   < > 28   BUN mg/dL 6   < > 9   CREATININE mg/dL 0 63   < > 0 58*   ANION GAP mmol/L 9   < > 9   CALCIUM mg/dL 8 6   < > 9 1   ALBUMIN g/dL  --   --  4 0   TOTAL BILIRUBIN mg/dL  --   --  0 60   ALK PHOS U/L  --   --  82   ALT U/L  --   --  33   AST U/L  --   --  17   GLUCOSE RANDOM mg/dL 104   < > 165*    < > = values in this interval not displayed  Results from last 7 days   Lab Units 02/12/20  2300   INR  1 08     Results from last 7 days   Lab Units 02/15/20  1541 02/15/20  1127 02/15/20  0702 02/14/20  2052 02/14/20  1611 02/14/20  1030 02/14/20  0642 02/13/20  2107 02/13/20  1616 02/13/20  1343 02/13/20  1147 02/13/20  0657   POC GLUCOSE mg/dl 165* 102 82 146* 181* 185* 147* 222* 147* 104 124 113     Results from last 7 days   Lab Units 02/13/20  0441   HEMOGLOBIN A1C % 6 3*     Results from last 7 days   Lab Units 02/12/20  2300   LACTIC ACID mmol/L 0 9           * I Have Reviewed All Lab Data Listed Above  * Additional Pertinent Lab Tests Reviewed: All Labs For Current Hospital Admission Reviewed    Imaging:    Imaging Reports Reviewed Today Include:   maulikn pertinent to this encounter  Imaging Personally Reviewed by Myself Includes:  As above  Recent Cultures (last 7 days):     Results from last 7 days   Lab Units 02/12/20  2330 02/12/20  2300   BLOOD CULTURE  No Growth at 48 hrs  No Growth at 48 hrs         Last 24 Hours Medication List:     Current Facility-Administered Medications:  acetaminophen 650 mg Oral Q6H PRN Irina Gustafson MD    cefazolin 2,000 mg Intravenous [facility], MD Last Rate: 2,000 mg (02/15/20 1738)   heparin (porcine) 5,000 Units Subcutaneous [facility] Lidia Oliver PA-C    HYDROmorphone 0 2 mg Intravenous Q4H PRN Crista Perry MD    influenza vaccine 0 5 mL Intramuscular Once Crista Perry MD    insulin lispro 1-5 Units Subcutaneous TID With Meals Shirin Lucero PA-C    insulin lispro 1-5 Units Subcutaneous HS Consuelo Sutherland PA-C    losartan 50 mg Oral Daily Crista Perry MD    metoprolol succinate 25 mg Oral Daily Crista Perry MD    metroNIDAZOLE 500 mg Intravenous Q8H Crista Perry MD Last Rate: 500 mg (02/15/20 1739)   oxyCODONE 2 5 mg Oral Q4H PRN Crista Perry MD    oxyCODONE 5 mg Oral Q4H PRN Crista Perry MD    polyethylene glycol 17 g Oral Daily PRN Juan Charlton PA-C    pravastatin 80 mg Oral Daily With Daniel Mckee MD    sodium chloride 2 g Oral 4x Daily (with meals and at bedtime) Nury Morales MD         Today, Patient Was Seen By: Mj Fagan MD    ** Please Note: Dictation voice to text software may have been used in the creation of this document   **

## 2020-02-15 NOTE — ASSESSMENT & PLAN NOTE
Status post appendectomy  Discuss with surgery stable for discharge from their point of view  DC abx  Still no BM     Will give miralax this am

## 2020-02-16 VITALS
RESPIRATION RATE: 16 BRPM | BODY MASS INDEX: 26.9 KG/M2 | DIASTOLIC BLOOD PRESSURE: 67 MMHG | OXYGEN SATURATION: 99 % | WEIGHT: 137 LBS | TEMPERATURE: 98.3 F | SYSTOLIC BLOOD PRESSURE: 136 MMHG | HEIGHT: 60 IN | HEART RATE: 54 BPM

## 2020-02-16 LAB
ANION GAP SERPL CALCULATED.3IONS-SCNC: 8 MMOL/L (ref 4–13)
ATRIAL RATE: 57 BPM
BUN SERPL-MCNC: 6 MG/DL (ref 5–25)
CALCIUM SERPL-MCNC: 8.7 MG/DL (ref 8.3–10.1)
CHLORIDE SERPL-SCNC: 99 MMOL/L (ref 100–108)
CO2 SERPL-SCNC: 26 MMOL/L (ref 21–32)
CREAT SERPL-MCNC: 0.57 MG/DL (ref 0.6–1.3)
GFR SERPL CREATININE-BSD FRML MDRD: 95 ML/MIN/1.73SQ M
GLUCOSE SERPL-MCNC: 101 MG/DL (ref 65–140)
GLUCOSE SERPL-MCNC: 110 MG/DL (ref 65–140)
GLUCOSE SERPL-MCNC: 141 MG/DL (ref 65–140)
P AXIS: 51 DEGREES
POTASSIUM SERPL-SCNC: 4 MMOL/L (ref 3.5–5.3)
PR INTERVAL: 212 MS
QRS AXIS: 75 DEGREES
QRSD INTERVAL: 84 MS
QT INTERVAL: 410 MS
QTC INTERVAL: 399 MS
SODIUM SERPL-SCNC: 133 MMOL/L (ref 136–145)
T WAVE AXIS: 40 DEGREES
VENTRICULAR RATE: 57 BPM

## 2020-02-16 PROCEDURE — 99232 SBSQ HOSP IP/OBS MODERATE 35: CPT | Performed by: INTERNAL MEDICINE

## 2020-02-16 PROCEDURE — 93010 ELECTROCARDIOGRAM REPORT: CPT | Performed by: INTERNAL MEDICINE

## 2020-02-16 PROCEDURE — 99239 HOSP IP/OBS DSCHRG MGMT >30: CPT | Performed by: INTERNAL MEDICINE

## 2020-02-16 PROCEDURE — 82948 REAGENT STRIP/BLOOD GLUCOSE: CPT

## 2020-02-16 PROCEDURE — 99221 1ST HOSP IP/OBS SF/LOW 40: CPT | Performed by: INTERNAL MEDICINE

## 2020-02-16 PROCEDURE — 80048 BASIC METABOLIC PNL TOTAL CA: CPT | Performed by: INTERNAL MEDICINE

## 2020-02-16 RX ORDER — SODIUM CHLORIDE 1000 MG
1 TABLET, SOLUBLE MISCELLANEOUS 2 TIMES DAILY WITH MEALS
Status: DISCONTINUED | OUTPATIENT
Start: 2020-02-16 | End: 2020-02-16 | Stop reason: HOSPADM

## 2020-02-16 RX ORDER — OXYCODONE HYDROCHLORIDE 5 MG/1
2.5 TABLET ORAL EVERY 4 HOURS PRN
Qty: 15 TABLET | Refills: 0 | Status: SHIPPED | OUTPATIENT
Start: 2020-02-16 | End: 2020-02-19

## 2020-02-16 RX ORDER — SODIUM CHLORIDE 1000 MG
2 TABLET, SOLUBLE MISCELLANEOUS
Status: DISCONTINUED | OUTPATIENT
Start: 2020-02-16 | End: 2020-02-16

## 2020-02-16 RX ORDER — SODIUM CHLORIDE 1000 MG
1 TABLET, SOLUBLE MISCELLANEOUS 2 TIMES DAILY WITH MEALS
Qty: 14 TABLET | Refills: 0 | Status: SHIPPED | OUTPATIENT
Start: 2020-02-16 | End: 2020-02-19 | Stop reason: SDUPTHER

## 2020-02-16 RX ORDER — POLYETHYLENE GLYCOL 3350 17 G/17G
17 POWDER, FOR SOLUTION ORAL DAILY PRN
Qty: 14 EACH | Refills: 0 | Status: SHIPPED | OUTPATIENT
Start: 2020-02-16

## 2020-02-16 RX ADMIN — METRONIDAZOLE 500 MG: 500 INJECTION, SOLUTION INTRAVENOUS at 01:14

## 2020-02-16 RX ADMIN — LOSARTAN POTASSIUM 50 MG: 50 TABLET, FILM COATED ORAL at 08:46

## 2020-02-16 RX ADMIN — CEFAZOLIN SODIUM 2000 MG: 2 SOLUTION INTRAVENOUS at 02:06

## 2020-02-16 RX ADMIN — Medication 2 G: at 08:46

## 2020-02-16 RX ADMIN — HEPARIN SODIUM 5000 UNITS: 5000 INJECTION INTRAVENOUS; SUBCUTANEOUS at 05:18

## 2020-02-16 RX ADMIN — METOPROLOL SUCCINATE 25 MG: 25 TABLET, EXTENDED RELEASE ORAL at 08:46

## 2020-02-16 NOTE — ASSESSMENT & PLAN NOTE
Had BM yesterday  Passing gas  Follow up with Dr Joy Callahan tomorrow  Also DC all abx     No need for ABx as per surgery team

## 2020-02-16 NOTE — DISCHARGE SUMMARY
Discharge- Kirsten Ca 1950, 71 y o  female MRN: [id number]    Unit/Bed#: S -01 Encounter: [id number]    Primary Care Provider: No primary care provider on file  Date and time admitted to hospital: 2/12/2020 10:54 PM        Chest pain  Assessment & Plan  No events on telemetry  Trop x 4 negative  Cardiology do not recommend any further testing  Hyponatremia  Assessment & Plan  Now normalizing  Renal are following  DC on salt tabs BID  Recheck BMP next week and follow up with nephrology  * Appendicitis  Assessment & Plan  Had BM yesterday  Passing gas  Follow up with Dr Willy Madden tomorrow  Also DC all abx  No need for ABx as per surgery team               Discharging Physician / Practitioner: Chava Mendoza MD  PCP: No primary care provider on file  Admission Date:   Admission Orders (From admission, onward)     Ordered        02/13/20 0231  Inpatient Admission  Once                   Discharge Date: 02/16/20    Resolved Problems  Date Reviewed: 2/16/2020    None          Consultations During Hospital Stay:  · Gen Surg - Dr Willy Madden  · Renal - Elsi Dejesus  · Cardiology - Dr Miguel Guillen    Procedures Performed:   1   No evidence of pulmonary embolism   No focal lung consolidation  2   The appendix is mildly prominent measuring 7 mm in diameter   There is mild surrounding fat stranding   Findings are suspicious for early appendicitis  Significant Findings / Test Results:   · As above  Incidental Findings:   · None  Test Results Pending at Discharge (will require follow up): · Needs to have BMP checked  Outpatient Tests Requested:  · BMP    Complications:  None  Reason for Admission:   Abdominal pain  Hospital Course:     Kirsten Ca is a 71 y o  female patient who originally presented to the hospital on 2/12/2020 due to abdominal pain and CT scan concerning for appendicitis  Underwent lap appendectomy on 2/13 and went on to make good recovery  Tolerating regular diet and having normal bowel and bladder function  Hospitalization was complicated by hyponatremia managed by renal Patient DC on salt tabs and protonix held  Renal and surgical follow up was recommended and set up prior to DC  Please see above list of diagnoses and related plan for additional information  Condition at Discharge: stable     Discharge Day Visit / Exam:     Subjective:    Patient seen and examined  Feeling "good"  No fevers, no chills  Vitals: Blood Pressure: 136/67 (02/16/20 0700)  Pulse: (!) 54 (02/16/20 0700)  Temperature: 98 3 °F (36 8 °C) (02/16/20 0700)  Temp Source: Oral (02/16/20 0700)  Respirations: 16 (02/16/20 0700)  Height: 5' (152 4 cm) (02/13/20 1338)  Weight - Scale: 62 1 kg (137 lb) (02/13/20 1338)  SpO2: 99 % (02/16/20 0700)  Exam:   Physical Exam   Constitutional: She is oriented to person, place, and time  She appears well-developed  No distress  HENT:   Head: Normocephalic  Mouth/Throat: No oropharyngeal exudate  Eyes: Pupils are equal, round, and reactive to light  Right eye exhibits no discharge  Left eye exhibits no discharge  No scleral icterus  Neck: No JVD present  No tracheal deviation present  No thyromegaly present  Cardiovascular: Normal rate  Exam reveals no gallop and no friction rub  No murmur heard  Pulmonary/Chest: Effort normal  No stridor  No respiratory distress  She has no wheezes  She exhibits no tenderness  Abdominal: Soft  She exhibits no distension  There is no tenderness  There is no rebound and no guarding  No hernia  Musculoskeletal: She exhibits no edema, tenderness or deformity  Lymphadenopathy:     She has no cervical adenopathy  Neurological: She is alert and oriented to person, place, and time  She displays normal reflexes  No sensory deficit  She exhibits normal muscle tone  Coordination normal    Skin: Capillary refill takes less than 2 seconds  No rash noted  She is not diaphoretic   No erythema  Psychiatric: She has a normal mood and affect  Discussion with Family: discussed with patient and with daughter over the phone  Discharge instructions/Information to patient and family:   See after visit summary for information provided to patient and family  Provisions for Follow-Up Care:  See after visit summary for information related to follow-up care and any pertinent home health orders  Disposition:     Home    For Discharges to [facility]:   · Not Applicable to this Patient - Not Applicable to this Patient    Planned Readmission: none  Discharge Statement:  I spent 45 minutes discharging the patient  This time was spent on the day of discharge  I had direct contact with the patient on the day of discharge  Greater than 50% of the total time was spent examining patient, answering all patient questions, arranging and discussing plan of care with patient as well as directly providing post-discharge instructions  Additional time then spent on discharge activities  Discharge Medications:  See after visit summary for reconciled discharge medications provided to patient and family        ** Please Note: This note has been constructed using a voice recognition system **

## 2020-02-16 NOTE — ASSESSMENT & PLAN NOTE
Now normalizing  Renal are following  DC on salt tabs BID  Recheck BMP next week and follow up with nephrology

## 2020-02-16 NOTE — CONSULTS
Consultation - Cardiology   [facility] Lnu 71 y o  female MRN: [id number]  Unit/Bed#: S -01 Encounter: [id number]  Physician Requesting Consult: Roxanne Arevalo MD  Reason for Consult / Principal Problem: CP    Assessment:  Principal Problem:    Appendicitis  Active Problems:    Hyponatremia    Hypertension    Diabetes mellitus, type 2 ([facility] )    Hyperlipidemia    Chest pain      Plan:  I feel that her symptoms are post op abd pain  Although she does not speak English, her complaint are localized to her abdomen  Her ECGs and all troponin levels are normal   I do not recommend any further cardiac testing  Please call with any questions  History of Present Illness     HPI: Ashely Oconnell is a 71y o  year old female who does not speak [place]  She was admitted 2/13/2020 with acute abd pain  Pain and CT scan findings were c/w acute appendicitis  She underwent laparoscopic appendectomy on 2/13/2020  Cardiology was asked to see her for CP  Troponin levels x 4 were negative  ECGs did not show ischemia  Her main complaint is abd pain and not chest pain  She has HTN, DM, hyperlipidemia            Review of Systems:    Alert awake oriented, comfortable, denies any complaints  No fevers chills nausea vomiting  No weakness, dizziness, seizures  no cough, shortness of breath, or wheezing  Denies any palpitations, chest pain, diaphoresis  Denies leg edema, pain or paresthesias  Denies any skin rashes  Denies abdominal pain, bloody stools, masses  Denies any depression or suicidal ideations      Historical Information   Past Medical History:   Diagnosis Date    Diabetes mellitus ([facility] )     Hyperlipidemia     Hypertension      Past Surgical History:   Procedure Laterality Date    HYSTERECTOMY      CO LAP,APPENDECTOMY N/A 2/13/2020    Procedure: APPENDECTOMY LAPAROSCOPIC;  Surgeon: Cindy Macdonald MD;  Location: [facility];  Service: General     Social History     Substance and Sexual Activity   Alcohol Use Never  Frequency: Never     Social History     Substance and Sexual Activity   Drug Use Never     Social History     Tobacco Use   Smoking Status Never Smoker   Smokeless Tobacco Never Used     Family History: non-contributory    Meds/Allergies   all current active meds have been reviewed  No Known Allergies    Objective   Vitals: Blood pressure 136/67, pulse (!) 54, temperature 98 3 °F (36 8 °C), temperature source Oral, resp  rate 16, height 5' (1 524 m), weight 62 1 kg (137 lb), SpO2 99 %  , Body mass index is 26 76 kg/m² ,   Orthostatic Blood Pressures      Most Recent Value   Blood Pressure  136/67 filed at 02/16/2020 0700   Patient Position - Orthostatic VS  Sitting filed at 02/16/2020 0700            Intake/Output Summary (Last 24 hours) at 2/16/2020 1140  Last data filed at 2/16/2020 0500  Gross per 24 hour   Intake 420 ml   Output 1800 ml   Net -1380 ml               Physical Exam:  GEN: Nika Villatoro appears well, alert and oriented x 3, pleasant and cooperative   HEENT: pupils equal, round, and reactive to light; extraocular muscles intact  NECK: supple, no carotid bruits   HEART: regular rhythm, normal S1 and S2, no murmurs, clicks, gallops or rubs   LUNGS: clear to auscultation bilaterally; no wheezes, rales, or rhonchi   ABDOMEN: normal bowel sounds, soft, no tenderness, no distention  EXTREMITIES: peripheral pulses normal; no clubbing, cyanosis, or edema  NEURO: no focal findings   SKIN: normal without suspicious lesions on exposed skin    Lab Results:   No results displayed because visit has over 200 results            Results from last 7 days   Lab Units 02/14/20 1955 02/14/20  1748 02/14/20  1341 02/12/20  2300   CK TOTAL U/L  --   --   --  92   TROPONIN I ng/mL <0 02 <0 02 <0 02 <0 02     Results from last 7 days   Lab Units 02/13/20  1953 02/13/20  0441 02/12/20  2300   WBC Thousand/uL  --  7 17 9 39   HEMOGLOBIN g/dL  --  11 2* 12 6   HEMATOCRIT %  --  33 5* 36 9   PLATELETS Thousands/uL 277 240 292 Results from last 7 days   Lab Units 02/16/20  0503 02/15/20  0649 02/14/20 1955  02/12/20  2300   POTASSIUM mmol/L 4 0 3 9 3 6   < > 3 3*   CHLORIDE mmol/L 99* 99* 92*   < > 89*   CO2 mmol/L 26 24 24   < > 28   BUN mg/dL 6 6 8   < > 9   CREATININE mg/dL 0 57* 0 63 0 74   < > 0 58*   CALCIUM mg/dL 8 7 8 6 8 3   < > 9 1   ALK PHOS U/L  --   --   --   --  82   ALT U/L  --   --   --   --  33   AST U/L  --   --   --   --  17    < > = values in this interval not displayed  Results from last 7 days   Lab Units 02/12/20  2300   INR  1 08           Imaging: I have personally reviewed pertinent reports  Counseling / Coordination of Care  Total floor / unit time spent today 60 minutes    Greater than 50% of total time was spent with the patient and / or family

## 2020-02-16 NOTE — DISCHARGE INSTR - AVS FIRST PAGE
Dear Kirsten Ca,     It was our pleasure to care for you here at [facility]  It is our hope that we were always able to exceed the expected standards for your care during your stay  You were hospitalized due to ***  You were cared for on the *** floor under the service of Chava Mendoza MD with the [facility] who covers for your primary care physician (PCP), No primary care provider on file  , while you were hospitalized  If you have any questions or concerns related to this hospitalization, you may contact us at [phone]  For follow up as well as medication refills, we recommend that you follow up with your primary care physician  A registered nurse will reach out to you by phone within a few days after your discharge to answer any additional questions that you may have after going home  However, at this time we provide for you here, the most important instructions / recommendations at discharge:     · Notable Medication Adjustments -   · Started on salt tablets 1g twice a day  · Please call nephrology for further instructions regarding follow up lab work to check sodium level  · Testing Required after Discharge -   · You need to check your sodium level  · Important follow up information -   · You need to follow up with a primary care physician - if you do not have one please call Dr Jaxon James office and set up an appointment for this upcoming week  · You need to follow up with Dr Willy Madden with regard to the surgery you underwent  · You need to follow up with nephrology regarding your sodium level  · Other Instructions -   · As above  · Please review this entire after visit summary as additional general instructions including medication list, appointments, activity, diet, any pertinent wound care, and other additional recommendations from your care team that may be provided for you        Sincerely,     Chava Mendoza, MD

## 2020-02-16 NOTE — PROGRESS NOTES
[facility] 50 PROGRESS NOTE   [facility] Lnu 71 y o  female MRN: [id number]  Unit/Bed#: S -01 Encounter: [id number]  Reason for Consult: hyponatremia    ASSESSMENT and PLAN:    63-year-old female with a past medical history of hypertension, diabetes, hyperlipidemia who presents with abdominal pain  Was found to have appendicitis and status post appendectomy on 02/14      1) hyponatremia     -admission sodium 126  Admission potassium was also low  - etiology unclear but possibly related to pain/SIADH/indapamide/less likely Protonix  -urine sodium-80-was on diuretic  -urine osmolarity -192  -serum osmolarity -275  -uric acid-3 6  -TSH-0 8  -cortisol-7 3-not significantly low  -CT of the chest, abdomen, pelvis with acute appendicitis but no other significant masses noted  -2/15-sodium level improving  Salt tablets reduced to 2 g 4 times a day  -2/16-sodium level improving to 133  Salt tablets reduced to 2 g 3 times a day  -continue fluid restriction  -lower salt tablets to 1 g 2 times a day  Is significant decrease but the patient is very uncomfortable with the salt tablets and therefore reducing  -continue holding diuretics  - repeat BMP in a m   -for now can continue losartan  -message to renal office office to set the patient up with a post hospital follow-up  -reviewed with the primary team attending     2) appendicitis-per primary surgery team   Status post appendectomy - final outpatient plans per primary team attending     3) chest discomfort-per primary team   Evaluation in progress     - bradycardia - primary team evaluating  - to note, pt states she was told of bradycardia in [facility] prior  - to note is on beta blocker     4) acid/base-bicarbonate stable     5) electrolytes-potassium stable  Hyponatremia as above       SUBJECTIVE / INTERVAL HISTORY:    Blood pressure is 097-200 systolic  Patient feels headaches with the salt tablets  Transient  Otherwise no complaints      OBJECTIVE:  Current Weight: Weight - Scale: 62 1 kg (137 lb)  Vitals:    02/15/20 0834 02/15/20 1500 02/15/20 2238 02/16/20 0700   BP: 128/59 103/52 143/72 136/67   BP Location:  Right arm Right arm Right arm   Pulse: 56 66 55 (!) 54   Resp:  17 16 16   Temp:  97 8 °F (36 6 °C) 97 6 °F (36 4 °C) 98 3 °F (36 8 °C)   TempSrc:  Oral Oral Oral   SpO2:  98% 100% 99%   Weight:       Height:           Intake/Output Summary (Last 24 hours) at 2/16/2020 1100  Last data filed at 2/16/2020 0500  Gross per 24 hour   Intake 420 ml   Output 1800 ml   Net -1380 ml     General: NAD  Skin: no rash  Eyes: anicteric sclera  ENT: moist mucous membrane  Neck: supple  Chest: CTA b/l, no ronchii, no wheeze, no rubs, no rales  CVS: s1s2, no murmur, no gallop, no rub  Abdomen: soft, nontender, nl sounds  Extremities: no edema LE b/l  : no banegas  Neuro: AAOX3  Psych: normal affect    Medications:    Current Facility-Administered Medications:     acetaminophen (TYLENOL) tablet 650 mg, 650 mg, Oral, Q6H PRN, Yunior Andersen MD, 650 mg at 02/14/20 1113    heparin (porcine) subcutaneous injection 5,000 Units, 5,000 Units, Subcutaneous, Q8H [address], 5,000 Units at 02/16/20 0518 **AND** [COMPLETED] Platelet count, , , Once, Adalberto Gustafson PA-C    HYDROmorphone (DILAUDID) injection 0 2 mg, 0 2 mg, Intravenous, Q4H PRN, Yunior Andersen MD    influenza vaccine, high-dose (FLUZONE HIGH-DOSE) IM injection NUBIA 0 5 mL, 0 5 mL, Intramuscular, Once, Yunior Andersen MD, Stopped at 02/13/20 0315    insulin lispro (HumaLOG) 100 units/mL subcutaneous injection 1-5 Units, 1-5 Units, Subcutaneous, TID With Meals, 1 Units at 02/15/20 1738 **AND** Fingerstick Glucose (POCT), , , 4x Daily AC and at bedtime, Parisa Gould PA-C    insulin lispro (HumaLOG) 100 units/mL subcutaneous injection 1-5 Units, 1-5 Units, Subcutaneous, HS, Consuelo Sutherland PA-C, 1 Units at 02/13/20 2129    losartan (COZAAR) tablet 50 mg, 50 mg, Oral, Daily, Yunior Andersen MD, 50 mg at 02/16/20 0846    metoprolol succinate (TOPROL-XL) 24 hr tablet 25 mg, 25 mg, Oral, Daily, Luis Angel Alvarenga MD, 25 mg at 02/16/20 0846    oxyCODONE (ROXICODONE) IR tablet 2 5 mg, 2 5 mg, Oral, Q4H PRN, Luis Angel Alvarenga MD, 2 5 mg at 02/14/20 1948    oxyCODONE (ROXICODONE) IR tablet 5 mg, 5 mg, Oral, Q4H PRN, Luis Angel Alvarenga MD, 5 mg at 02/14/20 0314    polyethylene glycol (MIRALAX) packet 17 g, 17 g, Oral, Daily PRN, Juan Charlton PA-C, 17 g at 02/15/20 1018    pravastatin (PRAVACHOL) tablet 80 mg, 80 mg, Oral, Daily With Bren Reyes MD, 80 mg at 02/15/20 1740    sodium chloride tablet 2 g, 2 g, Oral, 4x Daily (with meals and at bedtime), Tj Gillespie MD, 2 g at 02/16/20 0846    Laboratory Results:  Results from last 7 days   Lab Units 02/16/20  0503 02/15/20  0649 02/14/20 1955 02/14/20  1341 02/14/20  0541 02/13/20  1953 02/13/20  1544  02/13/20  0441 02/12/20  2300   WBC Thousand/uL  --   --   --   --   --   --   --   --  7 17 9 39   HEMOGLOBIN g/dL  --   --   --   --   --   --   --   --  11 2* 12 6   HEMATOCRIT %  --   --   --   --   --   --   --   --  33 5* 36 9   PLATELETS Thousands/uL  --   --   --   --   --  277  --   --  240 292   POTASSIUM mmol/L 4 0 3 9 3 6 3 6 3 9 3 9 3 8   < > 3 7 3 3*   CHLORIDE mmol/L 99* 99* 92* 91* 96* 98* 101   < > 96* 89*   CO2 mmol/L 26 24 24 27 22 22 26   < > 24 28   BUN mg/dL 6 6 8 8 7 7 5   < > 6 9   CREATININE mg/dL 0 57* 0 63 0 74 0 83 0 65 0 89 0 68   < > 0 51* 0 58*   CALCIUM mg/dL 8 7 8 6 8 3 8 8 8 8 8 5 8 9   < > 8 1* 9 1   MAGNESIUM mg/dL  --  1 7  --   --  1 9  --   --   --   --   --     < > = values in this interval not displayed

## 2020-02-16 NOTE — PROGRESS NOTES
Pt in bed upon arrival with call bell within reach and bed in lowest position  Pt telemetry working showing bradycardia (50-60bpm)  Pt did not report any pain or discomfort at this time

## 2020-02-17 ENCOUNTER — TRANSITIONAL CARE MANAGEMENT (OUTPATIENT)
Dept: INTERNAL MEDICINE CLINIC | Facility: CLINIC | Age: 70
End: 2020-02-17

## 2020-02-17 ENCOUNTER — TELEPHONE (OUTPATIENT)
Dept: INTERNAL MEDICINE CLINIC | Facility: CLINIC | Age: 70
End: 2020-02-17

## 2020-02-17 DIAGNOSIS — E87.1 HYPONATREMIA: Primary | ICD-10-CM

## 2020-02-17 NOTE — TELEPHONE ENCOUNTER
PT WAS DISCHARGED 2-16-20, FROM [facility], APPENDICITIS  SODIUM LOW   HAS APPT WITH DR Juancho Khanna 2-20-20

## 2020-02-18 LAB
BACTERIA BLD CULT: NORMAL
BACTERIA BLD CULT: NORMAL

## 2020-02-19 ENCOUNTER — TELEPHONE (OUTPATIENT)
Dept: NEPHROLOGY | Facility: CLINIC | Age: 70
End: 2020-02-19

## 2020-02-19 DIAGNOSIS — K37 APPENDICITIS: ICD-10-CM

## 2020-02-19 DIAGNOSIS — E87.1 HYPONATREMIA: ICD-10-CM

## 2020-02-19 RX ORDER — SODIUM CHLORIDE 1000 MG
1 TABLET, SOLUBLE MISCELLANEOUS 2 TIMES DAILY WITH MEALS
Qty: 60 TABLET | Refills: 3 | Status: SHIPPED | OUTPATIENT
Start: 2020-02-19 | End: 2020-02-28

## 2020-02-19 NOTE — TELEPHONE ENCOUNTER
Daughter called and states she has limited insurnace and was told to take her mom to nephrology, so if they tell her to see pcp , she will call us to r/s   Informed kristal

## 2020-02-19 NOTE — TELEPHONE ENCOUNTER
Pt's daughter called, pt scheduled to see Marjorie Villalobos on 2/28, she is on salt tabs 1 mg bid but was given only a week supply when discharged from the hospital and that will last her until 2/23, pt is going for repeat labs on 2/26, daughter is asking if she should continue the salt tabs until she sees Marjorie Villalobos or finish what she has and wait until appt?

## 2020-02-27 LAB
BUN SERPL-MCNC: 8 MG/DL (ref 7–25)
BUN/CREAT SERPL: NORMAL (CALC) (ref 6–22)
CALCIUM SERPL-MCNC: 9.3 MG/DL (ref 8.6–10.4)
CHLORIDE SERPL-SCNC: 99 MMOL/L (ref 98–110)
CO2 SERPL-SCNC: 30 MMOL/L (ref 20–32)
CREAT SERPL-MCNC: 0.58 MG/DL (ref 0.5–0.99)
GLUCOSE SERPL-MCNC: 97 MG/DL (ref 65–139)
POTASSIUM SERPL-SCNC: 4.3 MMOL/L (ref 3.5–5.3)
SL AMB EGFR AFRICAN AMERICAN: 109 ML/MIN/1.73M2
SL AMB EGFR NON AFRICAN AMERICAN: 94 ML/MIN/1.73M2
SODIUM SERPL-SCNC: 135 MMOL/L (ref 135–146)

## 2020-02-27 NOTE — RESULT ENCOUNTER NOTE
Na improving appropriately  Will review with pt at appt tomorrow   Likely will reduce salt tab vs hold salt tab and repeat BMP in one weel

## 2020-02-28 ENCOUNTER — OFFICE VISIT (OUTPATIENT)
Dept: NEPHROLOGY | Facility: CLINIC | Age: 70
End: 2020-02-28
Payer: COMMERCIAL

## 2020-02-28 VITALS
SYSTOLIC BLOOD PRESSURE: 112 MMHG | BODY MASS INDEX: 27.09 KG/M2 | HEART RATE: 60 BPM | DIASTOLIC BLOOD PRESSURE: 62 MMHG | WEIGHT: 138 LBS | HEIGHT: 60 IN

## 2020-02-28 DIAGNOSIS — E87.1 HYPONATREMIA: Primary | ICD-10-CM

## 2020-02-28 PROCEDURE — 99214 OFFICE O/P EST MOD 30 MIN: CPT | Performed by: INTERNAL MEDICINE

## 2020-02-28 NOTE — PROGRESS NOTES
NEPHROLOGY OFFICE VISIT   Sae Jaime Lnu 71 y o  female MRN: [id number]  2/28/2020    Reason for Visit: hyponatremia    ASSESSMENT and PLAN:    I had the pleasure of seeing Ms Aida Ordoñez today in the renal clinic for the continued management of hyponatremia         77-year-old female with a past medical history of hypertension, diabetes, hyperlipidemia who presents with abdominal pain to [facility] in early February   Was found to have appendicitis and status post appendectomy on 02/14      1) hyponatremia     -admission sodium 126  Admission potassium was also low  - etiology unclear but possibly related to pain/SIADH/indapamide/less likely Protonix  -urine sodium-80-was on diuretic  -urine osmolarity -192  -serum osmolarity -275  -uric acid-3 6  -TSH-0 8  -cortisol-7 3-not significantly low  -CT of the chest, abdomen, pelvis with acute appendicitis but no other significant masses noted  -2/15-sodium level improving  Salt tablets reduced to 2 g 4 times a day  -2/16-sodium level improving to 133  Salt tablets reduced to 2 g 3 times a day  -salt tablets were titrated lower in the hospital   Post hospitalization sodium level is normal at 135    - can hold salt tablets completely starting today  -repeat BMP next week or the week after  -continue fluid restriction 1 5 L for now  -if the sodium level remains stable, will remove fluid restriction also  -no further thiazide like diuretics  -blood pressure is well controlled today     2) appendicitis-status post appendectomy  Surgical visit post hospitalization next week for surgery team   I have message the surgical attending to review the abdomen findings today  Appears to be more scar tissue rather than infectious etiology  Patient afebrile  Abdomen is soft  Nontender abdomen     3) bradycardia-patient is on metoprolol per primary care teams  Chronic issue with bradycardia before  No changes from the renal standpoint for now    Defer further plans to the primary care physician      4) acid/base-bicarbonate stable     5) electrolytes-potassium stable   Hyponatremia as above    6) renal function-stable creatinine 0 5    7) HA - patient states has chronic headaches but also is having worsening headaches with the salt tablets based on the timing  Holding salt tablets for now and will evaluate further after  It was a pleasure evaluating your patient in the office today  Thank you for allowing our team to participate in the care of Ms Sandy Choi  Please do not hesitate to contact our team if further issues/questions shall arise in the interim  No problem-specific Assessment & Plan notes found for this encounter  HPI:    Patient with intermittent mild headaches lasting for 10 minutes otherwise no complaints  No fevers, chills, nausea, vomiting  PATIENT INSTRUCTIONS:    Patient Instructions   1) BMP lab end of next week or week after  2) DISCONTINUE salt tablets please  3) continue fluid restriction - 1 5 L daily  If you need little bit more that is ok if you feel thirsty  4) when you go to [facility], please let your physicians there know that we are recommending you avoid thiazide type diuretics in the future  If diuretics are needed, could attempt loop diuretic and check BMP 1-2 weeks after starting  Currently, your BP is well controlled with telmisartan and metoprolol  OBJECTIVE:  Current Weight: Weight - Scale: 62 6 kg (138 lb)  Vitals:    02/28/20 1408   BP: 112/62   BP Location: Left arm   Patient Position: Sitting   Cuff Size: Adult   Pulse: 60   Weight: 62 6 kg (138 lb)   Height: 5' (1 524 m)    Body mass index is 26 95 kg/m²  REVIEW OF SYSTEMS:    Review of Systems   Constitutional: Negative  Negative for fatigue  HENT: Negative  Eyes: Negative  Respiratory: Negative  Negative for shortness of breath  Cardiovascular: Negative  Negative for leg swelling  Gastrointestinal: Negative  Endocrine: Negative  Genitourinary: Negative  Negative for difficulty urinating  Musculoskeletal: Negative  Skin: Negative  Allergic/Immunologic: Negative  Neurological: Positive for headaches  Hematological: Negative  Psychiatric/Behavioral: Negative  All other systems reviewed and are negative  PHYSICAL EXAM:      Physical Exam   Constitutional: She is oriented to person, place, and time  She appears well-developed and well-nourished  No distress  HENT:   Head: Normocephalic and atraumatic  Mouth/Throat: No oropharyngeal exudate  Eyes: Conjunctivae are normal  Right eye exhibits no discharge  Left eye exhibits no discharge  No scleral icterus  Neck: Normal range of motion  Neck supple  No JVD present  Cardiovascular: Normal rate and regular rhythm  Exam reveals no gallop and no friction rub  No murmur heard  Pulmonary/Chest: Effort normal and breath sounds normal  No respiratory distress  She has no wheezes  She has no rales  Abdominal: Soft  Bowel sounds are normal  She exhibits no distension  There is no tenderness  There is no rebound  Musculoskeletal: Normal range of motion  She exhibits no edema, tenderness or deformity  Neurological: She is alert and oriented to person, place, and time  Coordination normal    Skin: Skin is warm and dry  No rash noted  She is not diaphoretic  No erythema  No pallor  Psychiatric: She has a normal mood and affect  Her behavior is normal  Judgment and thought content normal    Nursing note and vitals reviewed        Medications:    Current Outpatient Medications:     glimepiride (AMARYL) 1 mg tablet, Take 1 mg by mouth daily with breakfast, Disp: , Rfl:     METOPROLOL SUCCINATE PO, Take 23 75 mg by mouth daily Combination drug: Telmisartan 40 mg and Metoprolol Succinate 23 75 mg po daily, Disp: , Rfl:     NON FORMULARY, Take 1 tablet by mouth daily Moreplex CD 1 tablet after breakfast, Disp: , Rfl:     polyethylene glycol (MIRALAX) 17 g packet, Take 17 g by mouth daily as needed (for constipation ), Disp: 14 each, Rfl: 0    rosuvastatin (CRESTOR) 10 MG tablet, Take 10 mg by mouth daily Combination drug: Rosuvastatin 10mg po and Aspirin 75 mg po QHS, Disp: , Rfl:     telmisartan (MICARDIS) 40 mg tablet, Take 40 mg by mouth daily, Disp: , Rfl:     Laboratory Results:  Results from last 7 days   Lab Units 02/26/20  1132   POTASSIUM mmol/L 4 3   CHLORIDE mmol/L 99   CO2 mmol/L 30   BUN mg/dL 8   CREATININE mg/dL 0 58   SL AMB CALCIUM mg/dL 9 3       Results for orders placed or performed in visit on 39/11/10   Basic metabolic panel   Result Value Ref Range    Glucose, Random 97 65 - 139 mg/dL    BUN 8 7 - 25 mg/dL    Creatinine 0 58 0 50 - 0 99 mg/dL    eGFR Non  94 > OR = 60 mL/min/1 73m2    eGFR  109 > OR = 60 mL/min/1 73m2    SL AMB BUN/CREATININE RATIO NOT APPLICABLE 6 - 22 (calc)    Sodium 135 135 - 146 mmol/L    Potassium 4 3 3 5 - 5 3 mmol/L    Chloride 99 98 - 110 mmol/L    CO2 30 20 - 32 mmol/L    SL AMB CALCIUM 9 3 8 6 - 10 4 mg/dL

## 2020-02-28 NOTE — PATIENT INSTRUCTIONS
1) BMP lab end of next week or week after  2) DISCONTINUE salt tablets please  3) continue fluid restriction - 1 5 L daily  If you need little bit more that is ok if you feel thirsty  4) when you go to [facility], please let your physicians there know that we are recommending you avoid thiazide type diuretics in the future  If diuretics are needed, could attempt loop diuretic and check BMP 1-2 weeks after starting  Currently, your BP is well controlled with telmisartan and metoprolol

## 2020-03-02 PROBLEM — K37 APPENDICITIS: Status: RESOLVED | Noted: 2020-02-13 | Resolved: 2020-03-02

## 2020-03-02 NOTE — UTILIZATION REVIEW
Notification of Discharge  This is a Notification of Discharge from our facility [address]  Please be advised that this patient has been discharge from our facility  Below you will find the admission and discharge date and time including the patients disposition  PRESENTATION DATE: 2/12/2020 10:54 PM  OBS ADMISSION DATE:   IP ADMISSION DATE: 2/13/20 0231   DISCHARGE DATE: 2/16/2020  2:46 PM  DISPOSITION: Home/Self Care Home/Self Care   Admission Orders listed below:  Admission Orders (From admission, onward)     Ordered        02/13/20 0231  Inpatient Admission  Once                   Please contact the UR Department if additional information is required to close this patient's authorization/case  [address] [employer] Utilization Review Department  Main: [phone] x carefully listen to the prompts  All voicemails are confidential   [email or web address]  Send all requests for admission clinical reviews, approved or denied determinations and any other requests to dedicated fax number below belonging to the campus where the patient is receiving treatment   List of dedicated fax numbers:  [address] DENIALS (Administrative/Medical Necessity) [phone]   [address] (Maternity/NICU/Pediatrics) [phone]   Greg Heaven [phone]   Stefan Becket [phone]   Jacinta Carton [phone]   [address] [phone]   [facility]  [phone]   2205 [facility] [address] [facility]  [address] [phone]

## 2020-03-05 NOTE — UTILIZATION REVIEW
Please provide us with the authorization number for this case  Notification of Discharge  This is a Notification of Discharge from our facility [address]  Please be advised that this patient has been discharge from our facility  Below you will find the admission and discharge date and time including the patients disposition  PRESENTATION DATE: 2/12/2020 10:54 PM  OBS ADMISSION DATE:   IP ADMISSION DATE: 2/13/20 0231   DISCHARGE DATE: 2/16/2020  2:46 PM  DISPOSITION: Home/Self Care Home/Self Care   Admission Orders listed below:  Admission Orders (From admission, onward)     Ordered        02/13/20 0231  Inpatient Admission  Once                   Please contact the UR Department if additional information is required to close this patient's authorization/case  [address] [facility] Utilization Review Department  Main: [phone] x carefully listen to the prompts  All voicemails are confidential   [email or web address]  Send all requests for admission clinical reviews, approved or denied determinations and any other requests to dedicated fax number below belonging to the campus where the patient is receiving treatment   List of dedicated fax numbers:  [address] DENIALS (Administrative/Medical Necessity) [phone]   [address] (Maternity/NICU/Pediatrics) [phone]   Jasper Starr [phone]   Deedee Hernandez [phone]   Carlee Specter [phone]   [address] [phone]   [facility]  [phone]   2205 [facility], [facility]  2401 [facility] 1000 [facility] [phone]

## 2020-03-10 ENCOUNTER — TELEPHONE (OUTPATIENT)
Dept: NEPHROLOGY | Facility: CLINIC | Age: 70
End: 2020-03-10

## 2020-03-10 LAB
BUN SERPL-MCNC: 8 MG/DL (ref 7–25)
BUN/CREAT SERPL: ABNORMAL (CALC) (ref 6–22)
CALCIUM SERPL-MCNC: 9.3 MG/DL (ref 8.6–10.4)
CHLORIDE SERPL-SCNC: 100 MMOL/L (ref 98–110)
CO2 SERPL-SCNC: 29 MMOL/L (ref 20–32)
CREAT SERPL-MCNC: 0.59 MG/DL (ref 0.5–0.99)
GLUCOSE SERPL-MCNC: 105 MG/DL (ref 65–99)
POTASSIUM SERPL-SCNC: 4.7 MMOL/L (ref 3.5–5.3)
SL AMB EGFR AFRICAN AMERICAN: 108 ML/MIN/1.73M2
SL AMB EGFR NON AFRICAN AMERICAN: 94 ML/MIN/1.73M2
SODIUM SERPL-SCNC: 136 MMOL/L (ref 135–146)

## 2020-03-10 NOTE — TELEPHONE ENCOUNTER
----- Message from Dorothy Palumbo MD sent at 3/10/2020  7:58 AM EDT -----  Hello    Patient normally is followed up by Ms Nemo Queen  Can you please let the patient's son-in-law know that the sodium level has normalized  They can remove the fluid restriction      -from the renal standpoint patient, patient is stable    Thank you    np

## 2020-03-10 NOTE — RESULT ENCOUNTER NOTE
Hello    Patient normally is followed up by Ms Su Tate  Can you please let the patient's son-in-law know that the sodium level has normalized  They can remove the fluid restriction      -from the renal standpoint patient, patient is stable    Thank you    np

## 2020-04-14 ENCOUNTER — TELEPHONE (OUTPATIENT)
Dept: NEPHROLOGY | Facility: CLINIC | Age: 70
End: 2020-04-14

## 2020-05-17 LAB
APPEARANCE UR: CLEAR
BILIRUB UR QL STRIP: NEGATIVE
BUN SERPL-MCNC: 7 MG/DL (ref 7–25)
BUN/CREAT SERPL: ABNORMAL (CALC) (ref 6–22)
CALCIUM SERPL-MCNC: 9.3 MG/DL (ref 8.6–10.4)
CHLORIDE SERPL-SCNC: 97 MMOL/L (ref 98–110)
CO2 SERPL-SCNC: 29 MMOL/L (ref 20–32)
COLOR UR: YELLOW
CREAT SERPL-MCNC: 0.63 MG/DL (ref 0.5–0.99)
GLUCOSE SERPL-MCNC: 120 MG/DL (ref 65–99)
GLUCOSE UR QL STRIP: NEGATIVE
HGB UR QL STRIP: NEGATIVE
KETONES UR QL STRIP: NEGATIVE
LEUKOCYTE ESTERASE UR QL STRIP: NEGATIVE
NITRITE UR QL STRIP: NEGATIVE
PH UR STRIP: 6.5 [PH] (ref 5–8)
POTASSIUM SERPL-SCNC: 4.4 MMOL/L (ref 3.5–5.3)
PROT UR QL STRIP: NEGATIVE
SL AMB EGFR AFRICAN AMERICAN: 106 ML/MIN/1.73M2
SL AMB EGFR NON AFRICAN AMERICAN: 92 ML/MIN/1.73M2
SODIUM SERPL-SCNC: 132 MMOL/L (ref 135–146)
SP GR UR STRIP: 1.01 (ref 1–1.03)

## 2020-05-18 ENCOUNTER — TELEPHONE (OUTPATIENT)
Dept: NEPHROLOGY | Facility: CLINIC | Age: 70
End: 2020-05-18

## 2020-05-19 ENCOUNTER — DOCUMENTATION (OUTPATIENT)
Dept: NEPHROLOGY | Facility: CLINIC | Age: 70
End: 2020-05-19

## 2020-05-19 DIAGNOSIS — E87.1 HYPONATREMIA: Primary | ICD-10-CM

## 2020-05-19 DIAGNOSIS — I10 BENIGN ESSENTIAL HTN: Primary | ICD-10-CM

## 2020-05-19 DIAGNOSIS — I10 BENIGN ESSENTIAL HTN: ICD-10-CM

## 2020-05-19 DIAGNOSIS — E78.5 HYPERLIPIDEMIA, UNSPECIFIED HYPERLIPIDEMIA TYPE: Primary | ICD-10-CM

## 2020-05-19 DIAGNOSIS — E11.9 TYPE 2 DIABETES MELLITUS WITHOUT COMPLICATION, WITHOUT LONG-TERM CURRENT USE OF INSULIN (HCC): ICD-10-CM

## 2020-05-19 RX ORDER — TELMISARTAN 40 MG/1
40 TABLET ORAL DAILY
Qty: 30 TABLET | Refills: 3 | Status: SHIPPED | OUTPATIENT
Start: 2020-05-19 | End: 2020-05-19 | Stop reason: SDUPTHER

## 2020-05-19 RX ORDER — GLIMEPIRIDE 1 MG/1
1 TABLET ORAL
Qty: 30 TABLET | Refills: 0 | Status: SHIPPED | OUTPATIENT
Start: 2020-05-19 | End: 2020-09-25 | Stop reason: SDUPTHER

## 2020-05-19 RX ORDER — ROSUVASTATIN CALCIUM 10 MG/1
10 TABLET, COATED ORAL DAILY
Qty: 30 TABLET | Refills: 0 | Status: SHIPPED | OUTPATIENT
Start: 2020-05-19 | End: 2020-05-28 | Stop reason: SDUPTHER

## 2020-05-19 RX ORDER — TELMISARTAN 40 MG/1
40 TABLET ORAL DAILY
Qty: 30 TABLET | Refills: 3 | Status: SHIPPED | OUTPATIENT
Start: 2020-05-19 | End: 2020-09-25 | Stop reason: SDUPTHER

## 2020-05-26 ENCOUNTER — TELEPHONE (OUTPATIENT)
Dept: NEPHROLOGY | Facility: CLINIC | Age: 70
End: 2020-05-26

## 2020-05-28 DIAGNOSIS — I10 BENIGN ESSENTIAL HTN: ICD-10-CM

## 2020-05-28 DIAGNOSIS — E11.9 TYPE 2 DIABETES MELLITUS WITHOUT COMPLICATION, WITHOUT LONG-TERM CURRENT USE OF INSULIN (HCC): ICD-10-CM

## 2020-05-28 DIAGNOSIS — E78.5 HYPERLIPIDEMIA: Primary | ICD-10-CM

## 2020-05-28 DIAGNOSIS — E78.5 HYPERLIPIDEMIA, UNSPECIFIED HYPERLIPIDEMIA TYPE: ICD-10-CM

## 2020-05-28 RX ORDER — GLIMEPIRIDE 1 MG/1
1 TABLET ORAL
Qty: 30 TABLET | Refills: 2 | Status: SHIPPED | OUTPATIENT
Start: 2020-05-28

## 2020-05-28 RX ORDER — ROSUVASTATIN CALCIUM 10 MG/1
10 TABLET, COATED ORAL DAILY
Qty: 30 TABLET | Refills: 3 | Status: SHIPPED | OUTPATIENT
Start: 2020-05-28 | End: 2020-09-25 | Stop reason: SDUPTHER

## 2020-05-29 LAB
BUN SERPL-MCNC: 8 MG/DL (ref 7–25)
BUN/CREAT SERPL: ABNORMAL (CALC) (ref 6–22)
CALCIUM SERPL-MCNC: 9.5 MG/DL (ref 8.6–10.4)
CHLORIDE SERPL-SCNC: 99 MMOL/L (ref 98–110)
CO2 SERPL-SCNC: 29 MMOL/L (ref 20–32)
CREAT SERPL-MCNC: 0.64 MG/DL (ref 0.5–0.99)
GLUCOSE SERPL-MCNC: 115 MG/DL (ref 65–99)
POTASSIUM SERPL-SCNC: 4.8 MMOL/L (ref 3.5–5.3)
SL AMB EGFR AFRICAN AMERICAN: 106 ML/MIN/1.73M2
SL AMB EGFR NON AFRICAN AMERICAN: 91 ML/MIN/1.73M2
SODIUM SERPL-SCNC: 134 MMOL/L (ref 135–146)

## 2020-06-01 ENCOUNTER — TELEPHONE (OUTPATIENT)
Dept: NEPHROLOGY | Facility: CLINIC | Age: 70
End: 2020-06-01

## 2020-06-04 ENCOUNTER — DOCUMENTATION (OUTPATIENT)
Dept: NEPHROLOGY | Facility: CLINIC | Age: 70
End: 2020-06-04

## 2020-06-04 ENCOUNTER — TELEPHONE (OUTPATIENT)
Dept: NEPHROLOGY | Facility: CLINIC | Age: 70
End: 2020-06-04

## 2020-06-04 DIAGNOSIS — E87.1 HYPONATREMIA: Primary | ICD-10-CM

## 2020-06-04 RX ORDER — SODIUM CHLORIDE 1000 MG
1 TABLET, SOLUBLE MISCELLANEOUS DAILY
Qty: 30 TABLET | Refills: 3
Start: 2020-06-04 | End: 2020-09-25

## 2020-07-16 ENCOUNTER — TELEPHONE (OUTPATIENT)
Dept: NEPHROLOGY | Facility: CLINIC | Age: 70
End: 2020-07-16

## 2020-07-16 DIAGNOSIS — E87.1 HYPONATREMIA: Primary | ICD-10-CM

## 2020-07-16 NOTE — TELEPHONE ENCOUNTER
----- Message from Zainab Martinez MD sent at 7/15/2020  4:02 PM EDT -----  Hello    Patient normally is followed up by Ms Mere Valentin  Please let pt daughter know that sodium is lower 131    - should fluid restrict to 1 5 L; I know we liberalized intake to 2 L prior but she should be on 1 5 L  - cont salt tab  Ideally should be on salt tab one tab twice daily but she only tolerates one tab daily  - she needs further work up, but issue has been insurance issues for pt therefore we are attempting to manage within means of patient; but ideally pt needs full work up for hyponatremia, labs, cancer screening  Pt was going to wait until she went to [place]     - please have pt repeat BMP check in one month with changes above for now    Thank you    np

## 2020-08-10 DIAGNOSIS — E87.1 HYPONATREMIA: ICD-10-CM

## 2020-08-21 ENCOUNTER — TELEPHONE (OUTPATIENT)
Dept: NEPHROLOGY | Facility: CLINIC | Age: 70
End: 2020-08-21

## 2020-08-21 NOTE — TELEPHONE ENCOUNTER
Patients daughter called back they are currently in [place] and will stay for 1 more month  They will schedule a follow up once they are in [place] again  Aware of results

## 2020-08-21 NOTE — TELEPHONE ENCOUNTER
----- Message from Fabienne Homans, MD sent at 8/20/2020  5:22 PM EDT -----  Hello    Patient normally is followed up by Ms Michelle Ovalle  Please let the patient's daughter know that the sodium level stable  Potassium level is upper limit of normal   Renal function is normal   No changes for now  Patient should continue current regimen  What is the patient and family's plans regarding the patient's care locally in the [place]  If the patient is to continue to follow here locally, please schedule the patient to see me in 1-2 months  BMP in 1 month      Thank you    np

## 2020-08-24 NOTE — TELEPHONE ENCOUNTER
Thank you    When they come back, they may need to wait at least 2 weeks before scheduling with us due to quarantining rules    np

## 2020-08-25 ENCOUNTER — DOCUMENTATION (OUTPATIENT)
Dept: NEPHROLOGY | Facility: CLINIC | Age: 70
End: 2020-08-25

## 2020-08-25 LAB
EXT GLUCOSE BLD: 110
EXTERNAL BUN: 10
EXTERNAL CALCIUM: 9.6
EXTERNAL CHLORIDE: 100
EXTERNAL CO2: 27
EXTERNAL CREATININE: 0.65
EXTERNAL EGFR: 91
EXTERNAL POTASSIUM: 5
EXTERNAL SODIUM: 134

## 2020-09-17 ENCOUNTER — TELEPHONE (OUTPATIENT)
Dept: NEPHROLOGY | Facility: CLINIC | Age: 70
End: 2020-09-17

## 2020-09-17 DIAGNOSIS — E87.1 HYPONATREMIA: Primary | ICD-10-CM

## 2020-09-17 NOTE — TELEPHONE ENCOUNTER
Hello    For their convenience, we can do virtual but if they cannot do this, then agree, at least labs    BMP     Thank you    np

## 2020-09-17 NOTE — TELEPHONE ENCOUNTER
Pt's daughter called, pt will be going back home in November or December, she is scheduled for an appt with you on 9/25  Daughter asked if she will need to keep appt or if she has labs are you willing to review and determine if she will need to continue or stop salt tabs  She doesn't mind having the appt, virtual will be preferable  Also what labs?

## 2020-09-23 ENCOUNTER — TELEPHONE (OUTPATIENT)
Dept: NEPHROLOGY | Facility: CLINIC | Age: 70
End: 2020-09-23

## 2020-09-23 NOTE — TELEPHONE ENCOUNTER
I spoke with patient's daughter who would like to keep the appointment to discuss the sodium tablets  They will try to get the labs done prior to the appointment

## 2020-09-23 NOTE — TELEPHONE ENCOUNTER
Hello    Please reschedule appt to November so does not get missed    Please cancel this appt    Please ask them to get BMP when they can next week or week after if possible    Thank you    np

## 2020-09-23 NOTE — TELEPHONE ENCOUNTER
Patient's daughter called and said that they will not be able to have the blood work done for her appointment on Friday  The next available with you isn't until November  The daughter is asking for your recommendation on what to do

## 2020-09-24 LAB
BUN SERPL-MCNC: 10 MG/DL (ref 7–25)
BUN/CREAT SERPL: ABNORMAL (CALC) (ref 6–22)
CALCIUM SERPL-MCNC: 9.5 MG/DL (ref 8.6–10.4)
CHLORIDE SERPL-SCNC: 103 MMOL/L (ref 98–110)
CO2 SERPL-SCNC: 28 MMOL/L (ref 20–32)
CREAT SERPL-MCNC: 0.6 MG/DL (ref 0.5–0.99)
GLUCOSE SERPL-MCNC: 128 MG/DL (ref 65–99)
POTASSIUM SERPL-SCNC: 4.9 MMOL/L (ref 3.5–5.3)
SL AMB EGFR AFRICAN AMERICAN: 108 ML/MIN/1.73M2
SL AMB EGFR NON AFRICAN AMERICAN: 93 ML/MIN/1.73M2
SODIUM SERPL-SCNC: 139 MMOL/L (ref 135–146)

## 2020-09-25 ENCOUNTER — TELEMEDICINE (OUTPATIENT)
Dept: NEPHROLOGY | Facility: CLINIC | Age: 70
End: 2020-09-25
Payer: COMMERCIAL

## 2020-09-25 DIAGNOSIS — E78.5 HYPERLIPIDEMIA, UNSPECIFIED HYPERLIPIDEMIA TYPE: ICD-10-CM

## 2020-09-25 DIAGNOSIS — I10 BENIGN ESSENTIAL HTN: ICD-10-CM

## 2020-09-25 DIAGNOSIS — E87.1 HYPONATREMIA: Primary | ICD-10-CM

## 2020-09-25 DIAGNOSIS — E11.9 TYPE 2 DIABETES MELLITUS WITHOUT COMPLICATION, WITHOUT LONG-TERM CURRENT USE OF INSULIN (HCC): ICD-10-CM

## 2020-09-25 PROCEDURE — 99213 OFFICE O/P EST LOW 20 MIN: CPT | Performed by: INTERNAL MEDICINE

## 2020-09-25 RX ORDER — ROSUVASTATIN CALCIUM 10 MG/1
10 TABLET, COATED ORAL DAILY
Qty: 30 TABLET | Refills: 3 | Status: SHIPPED | OUTPATIENT
Start: 2020-09-25

## 2020-09-25 RX ORDER — TELMISARTAN 40 MG/1
40 TABLET ORAL DAILY
Qty: 30 TABLET | Refills: 3 | Status: SHIPPED | OUTPATIENT
Start: 2020-09-25 | End: 2020-10-13 | Stop reason: SDUPTHER

## 2020-09-25 RX ORDER — GLIMEPIRIDE 1 MG/1
1 TABLET ORAL
Qty: 30 TABLET | Refills: 1 | Status: SHIPPED | OUTPATIENT
Start: 2020-09-25 | End: 2020-10-13 | Stop reason: SDUPTHER

## 2020-09-25 NOTE — PROGRESS NOTES
Virtual Regular Visit      Assessment/Plan:    Problem List Items Addressed This Visit        Endocrine    Diabetes mellitus, type 2 ([address] )    Relevant Medications    glimepiride (AMARYL) 1 mg tablet       Other    Hyponatremia - Primary    Relevant Orders    Basic metabolic panel    Hyperlipidemia    Relevant Medications    rosuvastatin (CRESTOR) 10 MG tablet      Other Visit Diagnoses     Benign essential HTN        Relevant Medications    telmisartan (MICARDIS) 40 mg tablet    rosuvastatin (CRESTOR) 10 MG tablet    metoprolol tartrate (LOPRESSOR) 25 mg tablet    glimepiride (AMARYL) 1 mg tablet               Reason for visit is   Chief Complaint   Patient presents with    Virtual Regular Visit        Encounter provider Sheba Saleh MD    Provider located at [address] [place] [phone]      Recent Visits  Date Type Provider Dept   09/23/20 Telephone [address] recent visits within past 7 days and meeting all other requirements     Today's Visits  Date Type Provider Dept   09/25/20 Telemedicine Sheba Saleh MD [facility]   Showing today's visits and meeting all other requirements     Future Appointments  No visits were found meeting these conditions  Showing future appointments within next 150 days and meeting all other requirements        The patient was identified by name and date of birth  Colletta Bacca was informed that this is a telemedicine visit and that the visit is being conducted through [facility] and patient was informed that this is a secure, HIPAA-compliant platform  She agrees to proceed     My office door was closed  No one else was in the room  She acknowledged consent and understanding of privacy and security of the video platform  The patient has agreed to participate and understands they can discontinue the visit at any time      Patient is aware this is a billable service  Subjective    Sussy Cheung is a 71 y o  female intermittent L flank pain  HA still at times  Has tightness at surgical site otherwise no complaints  HPI     Past Medical History:   Diagnosis Date    Diabetes mellitus ([address] )     Hyperlipidemia     Hypertension        Past Surgical History:   Procedure Laterality Date    HYSTERECTOMY      IN LAP,APPENDECTOMY N/A 2/13/2020    Procedure: APPENDECTOMY LAPAROSCOPIC;  Surgeon: Taryn Blue MD;  Location: [facility];  Service: General       Current Outpatient Medications   Medication Sig Dispense Refill    glimepiride (AMARYL) 1 mg tablet Take 1 tablet (1 mg total) by mouth daily with breakfast 30 tablet 2    glimepiride (AMARYL) 1 mg tablet Take 1 tablet (1 mg total) by mouth daily with breakfast 30 tablet 1    metoprolol tartrate (LOPRESSOR) 25 mg tablet Take 0 5 tablets (12 5 mg total) by mouth every 12 (twelve) hours 30 tablet 3    NON FORMULARY Take 1 tablet by mouth daily Moreplex CD 1 tablet after breakfast      polyethylene glycol (MIRALAX) 17 g packet Take 17 g by mouth daily as needed (for constipation  ) 14 each 0    rosuvastatin (CRESTOR) 10 MG tablet Take 10 mg by mouth daily Combination drug: Rosuvastatin 10mg po and Aspirin 75 mg po QHS      rosuvastatin (CRESTOR) 10 MG tablet Take 1 tablet (10 mg total) by mouth daily 30 tablet 3    telmisartan (MICARDIS) 40 mg tablet Take 1 tablet (40 mg total) by mouth daily 30 tablet 3     No current facility-administered medications for this visit  No Known Allergies    Review of Systems   Gastrointestinal:        Intermittent surg site discomfort  Neurological: Positive for headaches  Video Exam    There were no vitals filed for this visit  Physical Exam  Constitutional:       Appearance: Normal appearance  Eyes:      General: No scleral icterus  Neurological:      Mental Status: She is alert and oriented to person, place, and time     Psychiatric: Mood and Affect: Mood normal          Behavior: Behavior normal          Thought Content: Thought content normal          Judgment: Judgment normal           51-year-old female with a past medical history of hypertension, diabetes, hyperlipidemia who presents with abdominal pain to [facility] in early February   Was found to have appendicitis and status post appendectomy on 02/14      1) hyponatremia     -admission sodium 126  Admission potassium was also low  - etiology unclear but possibly related to pain/SIADH/indapamide/less likely Protonix  -urine sodium-80-was on diuretic  -urine osmolarity -192  -serum osmolarity -275  -uric acid-3 6  -TSH-0 8  -cortisol-7 3-not significantly low  -CT of the chest, abdomen, pelvis with acute appendicitis but no other significant masses noted  -2/15-sodium level improving   Salt tablets reduced to 2 g 4 times a day  -2/16-sodium level improving to 133  Salt tablets reduced to 2 g 3 times a day  -salt tablets were titrated lower in the hospital   Post hospitalization sodium level is normal at 135  Plan:  -  Salt tablets were restarted in June 2020 due to decreased sodium level  Sodium levels had improved  Repeat blood work this week with normal sodium  Patient daughter states that the patient stop salt tablets on own approximately 1 week ago  -therefore will hold salt tablets for now   -Repeat BMP in 1 month   -no further thiazide like diuretics  - patient does not have a primary care physician in the [place]  I advised the patient that she will need close follow-up when she goes back to [facility]  The family states understanding of my concerns that the patient does need to stay up-to-date with age-appropriate cancer screening  With regards to the headaches, patient will wait until going back to [facility] to get treatment  This is partially due to lack of insurance issues  Patient does not have alarm features of neurological signs    Daughter states understanding of our attempt to help the patient as best as we can in the patient's current situation     2) appendicitis-status post appendectomy     3) bradycardia-patient is on metoprolol per primary care teams  Chronic issue with bradycardia before  No changes from the renal standpoint for now  Defer further plans to the primary care physician      4) acid/base-bicarbonate stable     5) electrolytes-potassium stable   Hyponatremia as above     6) renal function-stable creatinine 0 5     7) HA -   Patient would likely benefit from a neurological evaluation    8) bladder dysfunction - states in AM has decreased urine flow but otherwise nl  Advised Urological/u/s f/u but will await until going to [facility]     It was a pleasure evaluating your patient  Thank you for allowing our team to participate in the care of Ms Shannen Peterson  Please do not hesitate to contact our team if further issues/questions shall arise in the interim  I spent 16 minutes directly with the patient during this visit      VIRTUAL VISIT DISCLAIMER    Shannen Peterson acknowledges that she has consented to an online visit or consultation  She understands that the online visit is based solely on information provided by her, and that, in the absence of a face-to-face physical evaluation by the physician, the diagnosis she receives is both limited and provisional in terms of accuracy and completeness  This is not intended to replace a full medical face-to-face evaluation by the physician  Ivonneus Peterson understands and accepts these terms

## 2020-09-25 NOTE — PATIENT INSTRUCTIONS
1) Avoid NSAIDS - (Example - motrin, advil, ibuprofen, aleve, exederin, etc)  2) Always follow a low salt diet  3) continue holding salt tablet  4) labwork in one month  5) the rest of your medications will remain the same  For your Physician team in [facility] - Ms Migel Gallardo  Is a 68-year-old female with a past medical history of hypertension, diabetes, hyperlipidemia who initially presented to the hospital with abdominal pain in February and was found to have appendicitis and is status post appendectomy on 2020  Course was complicated by hyponatremia  The etiology of this was likely secondary to pain / SIADH/ indepamide/PPI  -  Sodium levels continued to fluctuate as an outpatient periodically requiring salt tablets  This may have been multifactorial in the setting of SIADH  Also pain from headaches     -Sodium levels have normalized and patient is off of salt tablets completely since 2020   - we would advise the followin)  Complete a BMP every 3-4 months (basic metabolic panel)  2)  If the sodium levels began to decrease, may need to consider restarting salt tablets and fluid restriction  3)  Would also advised age-appropriate cancer screening  4)  Would advise neurological evaluation for headaches  5) please avoid any thiazide type diuretics in the future  Can consider loop diuretic if needed    Please call with any questions/issues  Thank you

## 2020-10-13 DIAGNOSIS — E11.9 TYPE 2 DIABETES MELLITUS WITHOUT COMPLICATION, WITHOUT LONG-TERM CURRENT USE OF INSULIN (HCC): ICD-10-CM

## 2020-10-13 DIAGNOSIS — I10 BENIGN ESSENTIAL HTN: ICD-10-CM

## 2020-10-13 RX ORDER — TELMISARTAN 40 MG/1
40 TABLET ORAL DAILY
Qty: 30 TABLET | Refills: 5 | Status: SHIPPED | OUTPATIENT
Start: 2020-10-13

## 2020-10-13 RX ORDER — ROSUVASTATIN CALCIUM 10 MG/1
10 TABLET, COATED ORAL DAILY
Qty: 30 TABLET | Refills: 5 | Status: SHIPPED | OUTPATIENT
Start: 2020-10-13

## 2020-10-13 RX ORDER — GLIMEPIRIDE 1 MG/1
1 TABLET ORAL
Qty: 30 TABLET | Refills: 3 | Status: SHIPPED | OUTPATIENT
Start: 2020-10-13

## 2020-11-15 LAB
BUN SERPL-MCNC: 11 MG/DL (ref 7–25)
BUN/CREAT SERPL: ABNORMAL (CALC) (ref 6–22)
CALCIUM SERPL-MCNC: 9.2 MG/DL (ref 8.6–10.4)
CHLORIDE SERPL-SCNC: 100 MMOL/L (ref 98–110)
CO2 SERPL-SCNC: 27 MMOL/L (ref 20–32)
CREAT SERPL-MCNC: 0.56 MG/DL (ref 0.5–0.99)
GLUCOSE SERPL-MCNC: 106 MG/DL (ref 65–99)
POTASSIUM SERPL-SCNC: 4.4 MMOL/L (ref 3.5–5.3)
SL AMB EGFR AFRICAN AMERICAN: 110 ML/MIN/1.73M2
SL AMB EGFR NON AFRICAN AMERICAN: 95 ML/MIN/1.73M2
SODIUM SERPL-SCNC: 135 MMOL/L (ref 135–146)

## 2020-11-16 ENCOUNTER — TELEPHONE (OUTPATIENT)
Dept: NEPHROLOGY | Facility: CLINIC | Age: 70
End: 2020-11-16

## 2021-01-12 DIAGNOSIS — E87.1 HYPONATREMIA: Primary | ICD-10-CM

## 2021-01-18 LAB
BUN SERPL-MCNC: 8 MG/DL (ref 7–25)
BUN/CREAT SERPL: 14 (CALC) (ref 6–22)
CALCIUM SERPL-MCNC: 9.5 MG/DL (ref 8.6–10.4)
CHLORIDE SERPL-SCNC: 97 MMOL/L (ref 98–110)
CO2 SERPL-SCNC: 30 MMOL/L (ref 20–32)
CREAT SERPL-MCNC: 0.56 MG/DL (ref 0.6–0.93)
GLUCOSE SERPL-MCNC: 117 MG/DL (ref 65–99)
POTASSIUM SERPL-SCNC: 4.6 MMOL/L (ref 3.5–5.3)
SL AMB EGFR AFRICAN AMERICAN: 109 ML/MIN/1.73M2
SL AMB EGFR NON AFRICAN AMERICAN: 94 ML/MIN/1.73M2
SODIUM SERPL-SCNC: 134 MMOL/L (ref 135–146)

## 2021-01-19 ENCOUNTER — TELEPHONE (OUTPATIENT)
Dept: NEPHROLOGY | Facility: CLINIC | Age: 71
End: 2021-01-19

## 2021-01-19 NOTE — RESULT ENCOUNTER NOTE
Hello    Patient normally is followed up by Ms Marysol Borrego  Please let daughter know that sodium level ok for now at 134  No changes   Creat stable  - are we still following patient?  - pt was to go to [place] but because of covid was not able to; then daughter cancelled last appt it looks like  - if we are still seeing patient, please have pt set up for virtual visit in 2-3 months with BMP prior to appt    Thank you    np

## 2021-01-19 NOTE — TELEPHONE ENCOUNTER
----- Message from Katya Romero MD sent at 1/19/2021  7:57 AM EST -----  Hello    Patient normally is followed up by Ms Melina Solorio  Please let daughter know that sodium level ok for now at 134  No changes   Creat stable  - are we still following patient?  - pt was to go to [facility] but because of covid was not able to; then daughter cancelled last appt it looks like  - if we are still seeing patient, please have pt set up for virtual visit in 2-3 months with BMP prior to appt    Thank you    np

## 2021-01-27 ENCOUNTER — TELEPHONE (OUTPATIENT)
Dept: NEPHROLOGY | Facility: CLINIC | Age: 71
End: 2021-01-27

## 2021-01-28 ENCOUNTER — DOCUMENTATION (OUTPATIENT)
Dept: NEPHROLOGY | Facility: CLINIC | Age: 71
End: 2021-01-28

## 2021-02-13 DIAGNOSIS — Z23 ENCOUNTER FOR IMMUNIZATION: ICD-10-CM

## 2023-10-22 NOTE — TREATMENT PLAN
Repeat sodium back to 133 corrected for glucose 7 meq rise in roughly 24 hours, will decrease d5w to 50 cc/hour for next 8 hours and will make adjustment according to the repeat sodium in AM Opt out

## 2025-01-04 NOTE — UTILIZATION REVIEW
Continued Stay Review    Date: 2/15/2020                         Current Patient Class: Inpatient    Current Level of Care: med surg    HPI:69 y o  female initially admitted on 2/16/2020 ED from family's home admitted as inpatient due to hyponatremia likely from dehydration, and acute appendicitis  Presented with R abd pain, poor appetite, and nausea  She was seen at urgent care 1d pta due to worsening R abd pain and nausea  Na was 125 there, and referral made to ED    Assessment/Plan:   SURGERY DATE: 2/13/2020  Anesthesia Type:   General  Procedure(s) (LRB):  APPENDECTOMY LAPAROSCOPIC (N/A)  Operative Findings:  Independent, nonsmoker, ASA 2, wound class 2, BMI 27, weight 140, height 60    2/15 Attending provider: Chest pain off & on since early in am- given risk factors will place on Telemetry & trend TROPs  Noted for slight bradycardia  Hyponatremia: normalizing with Nephrology following  S/P Appendectomy POD#2- DC antibiotics, still no BM, give Miralax  2/15 Nephrology: Hyponatremia: Cont fluid restriction, salt tab but lower dose to 2 g from 3 g & likely will lower to TID  Hold Diuretics  Repeat BMP 2/16  Continue Losartan     Pertinent Labs/Diagnostic Results:       Results from last 7 days   Lab Units 02/26/20  1132   SODIUM mmol/L 135   POTASSIUM mmol/L 4 3   CHLORIDE mmol/L 99   CO2 mmol/L 30   BUN mg/dL 8   CREATININE mg/dL 0 58   SL AMB CALCIUM mg/dL 9 3             Results from last 7 days   Lab Units 02/26/20  1132   GLUCOSE RANDOM mg/dL 97             BETA-HYDROXYBUTYRATE   Date Value Ref Range Status   02/12/2020 0 1 <0 6 mmol/L Final      2/15/2020  Ref Range & Units 2/15/20 2145    Ventricular Rate BPM 57    Atrial Rate BPM 57    MI Interval ms 212    QRSD Interval ms 84    QT Interval ms 410    QTC Interval ms 399    P Axis degrees 51    QRS Axis degrees 75    T Wave Axis degrees 40       Narrative & Impression     Sinus bradycardia with 1st degree A-V block  Otherwise normal ECG  When compared with ECG of 14-FEB-2020 21:41,  No significant change was found       Vital Signs:   Date/Time  Temp  Pulse  Resp  BP  MAP (mmHg)  SpO2  O2 Device  Patient Position - Orthostatic VS   02/16/20 0700  98 3 °F (36 8 °C)  54Abnormal   16  136/67    99 %  None (Room air)  Sitting   02/15/20 2238  97 6 °F (36 4 °C)  55  16  143/72  99  100 %  None (Room air)  Lying   02/15/20 1500  97 8 °F (36 6 °C)  66  17  103/52  74  98 %  None (Room air)  Lying     Intake/Output Summary (Last 24 hours) at 2/15/2020 1222  Last data filed at 2/15/2020 0500      Gross per 24 hour   Intake 240 ml   Output 2100 ml   Net -1860 ml     Medications:   Scheduled Medications:  Current Facility-Administered Medications:  acetaminophen 650 mg Oral Q6H PRN   cefazolin 2,000 mg Intravenous Q8H   heparin (porcine) 5,000 Units Subcutaneous Q8H [facility]   HYDROmorphone 0 2 mg Intravenous Q4H PRN   influenza vaccine 0 5 mL Intramuscular Once   insulin lispro 1-5 Units Subcutaneous TID With Meals   insulin lispro 1-5 Units Subcutaneous HS   losartan 50 mg Oral Daily   metoprolol succinate 25 mg Oral Daily   metroNIDAZOLE 500 mg Intravenous Q8H   oxyCODONE 2 5 mg Oral Q4H PRN   oxyCODONE 5 mg Oral Q4H PRN   pravastatin 80 mg Oral Daily With Dinner   sodium chloride 2 g Oral TID With Meals      Continuous IV Infusions:  PRN Meds:    Discharge Plan: TBD  Network Utilization Review Department  [email or web address]  ATTENTION: Please call with any questions or concerns to [phone] and carefully listen to the prompts so that you are directed to the right person  All voicemails are confidential   Danielle Dennis all requests for admission clinical reviews, approved or denied determinations and any other requests to dedicated fax number below belonging to the campus where the patient is receiving treatment   List of dedicated fax numbers for the Facilities:  FACILITY NAME UR FAX NUMBER   ADMISSION DENIALS (Administrative/Medical Necessity) [facility] (Maternity/NICU/Pediatrics) [phone]   Demetrio Brady [phone]   [facility] [phone]   Boogie Standing [phone]   [facility] [address] [place] [address] [phone]   [facility]  [phone]   [address] [facility]  [address] [facility] [address] [phone] Initial (On Arrival)

## (undated) DEVICE — INTENDED FOR TISSUE SEPARATION, AND OTHER PROCEDURES THAT REQUIRE A SHARP SURGICAL BLADE TO PUNCTURE OR CUT.: Brand: BARD-PARKER SAFETY BLADES SIZE 11, STERILE

## (undated) DEVICE — TROCAR: Brand: KII FIOS FIRST ENTRY

## (undated) DEVICE — LIGHT HANDLE COVER SLEEVE DISP BLUE STELLAR

## (undated) DEVICE — UNDYED BRAIDED (POLYGLACTIN 910), SYNTHETIC ABSORBABLE SUTURE: Brand: COATED VICRYL

## (undated) DEVICE — ADHESIVE SKIN HIGH VISCOSITY EXOFIN 1ML

## (undated) DEVICE — TISSUE RETRIEVAL SYSTEM: Brand: INZII RETRIEVAL SYSTEM

## (undated) DEVICE — HARMONIC 1100 SHEARS, 36CM SHAFT LENGTH: Brand: HARMONIC

## (undated) DEVICE — STERILE SURGICAL LUBRICANT,  TUBE: Brand: SURGILUBE

## (undated) DEVICE — NEEDLE 22 G X 1 1/2 SAFETY

## (undated) DEVICE — ALLENTOWN LAP CHOLE APP PACK: Brand: CARDINAL HEALTH

## (undated) DEVICE — SUT MONOCRYL 4-0 PS-2 27 IN Y426H

## (undated) DEVICE — ENDOPATH ETS-FLEX45 ARTICULATING ENDOSCOPIC LINEAR CUTTER, NO RELOAD: Brand: ENDOPATH

## (undated) DEVICE — CHLORAPREP HI-LITE 26ML ORANGE

## (undated) DEVICE — TROCAR APPPLE 5MM EXTENDED LENGTH

## (undated) DEVICE — IRRIG ENDO FLO TUBING

## (undated) DEVICE — DRAPE EQUIPMENT RF WAND

## (undated) DEVICE — ETS45 RELOAD STANDARD 45MM: Brand: ENDOPATH

## (undated) DEVICE — VIAL DECANTER

## (undated) DEVICE — GLOVE SRG BIOGEL ECLIPSE 7